# Patient Record
Sex: FEMALE | Race: BLACK OR AFRICAN AMERICAN | Employment: OTHER | URBAN - METROPOLITAN AREA
[De-identification: names, ages, dates, MRNs, and addresses within clinical notes are randomized per-mention and may not be internally consistent; named-entity substitution may affect disease eponyms.]

---

## 2017-02-20 RX ORDER — ROSUVASTATIN CALCIUM 10 MG/1
10 TABLET, COATED ORAL
OUTPATIENT
Start: 2017-02-20

## 2017-02-21 NOTE — TELEPHONE ENCOUNTER
Called and spoke with Patient, she states\" she will have to call me back after checking her calendar to schedule appt. \". She was informed she needs to have an appt w/in the next 2 weeks for medication refills.

## 2017-05-08 RX ORDER — LIOTHYRONINE SODIUM 5 UG/1
5 TABLET ORAL DAILY
Qty: 30 TAB | Refills: 0 | Status: SHIPPED | OUTPATIENT
Start: 2017-05-08 | End: 2017-06-08 | Stop reason: SDUPTHER

## 2017-05-08 RX ORDER — LEVOTHYROXINE SODIUM 100 UG/1
100 TABLET ORAL
Qty: 30 TAB | Refills: 0 | Status: SHIPPED | OUTPATIENT
Start: 2017-05-08 | End: 2017-06-08 | Stop reason: SDUPTHER

## 2017-05-12 ENCOUNTER — DOCUMENTATION ONLY (OUTPATIENT)
Dept: INTERNAL MEDICINE CLINIC | Age: 67
End: 2017-05-12

## 2017-05-12 ENCOUNTER — OFFICE VISIT (OUTPATIENT)
Dept: INTERNAL MEDICINE CLINIC | Age: 67
End: 2017-05-12

## 2017-05-12 ENCOUNTER — HOSPITAL ENCOUNTER (OUTPATIENT)
Dept: LAB | Age: 67
Discharge: HOME | End: 2017-05-12
Payer: MEDICARE

## 2017-05-12 VITALS
SYSTOLIC BLOOD PRESSURE: 113 MMHG | DIASTOLIC BLOOD PRESSURE: 71 MMHG | TEMPERATURE: 97.8 F | HEIGHT: 63 IN | HEART RATE: 74 BPM | BODY MASS INDEX: 33.66 KG/M2 | WEIGHT: 190 LBS | RESPIRATION RATE: 16 BRPM | OXYGEN SATURATION: 98 %

## 2017-05-12 DIAGNOSIS — Z00.00 ROUTINE GENERAL MEDICAL EXAMINATION AT A HEALTH CARE FACILITY: ICD-10-CM

## 2017-05-12 DIAGNOSIS — E03.9 ACQUIRED HYPOTHYROIDISM: ICD-10-CM

## 2017-05-12 DIAGNOSIS — Z13.39 SCREENING FOR ALCOHOLISM: ICD-10-CM

## 2017-05-12 DIAGNOSIS — Z13.31 DEPRESSION SCREEN: ICD-10-CM

## 2017-05-12 DIAGNOSIS — E78.2 MIXED HYPERLIPIDEMIA: ICD-10-CM

## 2017-05-12 DIAGNOSIS — Z11.59 NEED FOR HEPATITIS C SCREENING TEST: ICD-10-CM

## 2017-05-12 DIAGNOSIS — E11.9 CONTROLLED TYPE 2 DIABETES MELLITUS WITHOUT COMPLICATION, WITHOUT LONG-TERM CURRENT USE OF INSULIN (HCC): Primary | ICD-10-CM

## 2017-05-12 DIAGNOSIS — E55.9 HYPOVITAMINOSIS D: ICD-10-CM

## 2017-05-12 PROCEDURE — 83036 HEMOGLOBIN GLYCOSYLATED A1C: CPT

## 2017-05-12 PROCEDURE — 85027 COMPLETE CBC AUTOMATED: CPT

## 2017-05-12 PROCEDURE — 84439 ASSAY OF FREE THYROXINE: CPT

## 2017-05-12 PROCEDURE — 80053 COMPREHEN METABOLIC PANEL: CPT

## 2017-05-12 PROCEDURE — 82043 UR ALBUMIN QUANTITATIVE: CPT

## 2017-05-12 PROCEDURE — 84443 ASSAY THYROID STIM HORMONE: CPT

## 2017-05-12 PROCEDURE — 86803 HEPATITIS C AB TEST: CPT

## 2017-05-12 PROCEDURE — 36415 COLL VENOUS BLD VENIPUNCTURE: CPT

## 2017-05-12 PROCEDURE — 82306 VITAMIN D 25 HYDROXY: CPT

## 2017-05-12 PROCEDURE — 80061 LIPID PANEL: CPT

## 2017-05-12 RX ORDER — ROSUVASTATIN CALCIUM 5 MG/1
5 TABLET, COATED ORAL
Qty: 30 TAB | Refills: 1 | Status: SHIPPED | OUTPATIENT
Start: 2017-05-12 | End: 2018-10-10

## 2017-05-12 NOTE — MR AVS SNAPSHOT
Visit Information Date & Time Provider Department Dept. Phone Encounter #  
 5/12/2017 11:45 AM Nakia Zhao, [address] [id number] Follow-up Instructions Return in about 6 months (around 11/12/2017). Upcoming Health Maintenance Date Due Hepatitis C Screening 1950 EYE EXAM RETINAL OR DILATED Q1 2/21/1962 MICROALBUMIN Q1 2/12/2017 GLAUCOMA SCREENING Q2Y 2/21/2017 Pneumococcal 65+ Low/Medium Risk (1 of 2 - PCV13) 2/21/2017 MEDICARE YEARLY EXAM 2/21/2017 HEMOGLOBIN A1C Q6M 5/8/2017 FOOT EXAM Q1 7/5/2017 INFLUENZA AGE 9 TO ADULT 8/1/2017 LIPID PANEL Q1 11/22/2017 BREAST CANCER SCRN MAMMOGRAM 2/24/2018 COLONOSCOPY 10/4/2026 DTaP/Tdap/Td series (2 - Td) 11/14/2026 Allergies as of 5/12/2017  Review Complete On: 5/12/2017 By: Oswaldo Weir LPN Severity Noted Reaction Type Reactions Aspirin  11/24/2010    Hives Nsaids (Non-steroidal Anti-inflammatory Drug)  11/24/2010    Hives Percocet [Oxycodone-acetaminophen]  08/15/2011   Systemic Swelling Wrong medication---[facility] Current Immunizations  Never Reviewed No immunizations on file. Not reviewed this visit You Were Diagnosed With   
  
 Codes Comments Controlled type 2 diabetes mellitus without complication, without long-term current use of insulin ([facility]    -  Primary ICD-10-CM: E11.9 ICD-9-CM: 250.00 Mixed hyperlipidemia     ICD-10-CM: E78.2 ICD-9-CM: 272.2 Routine general medical examination at a health care facility     ICD-10-CM: Z00.00 ICD-9-CM: V70.0 Screening for alcoholism     ICD-10-CM: Z13.89 ICD-9-CM: V79.1 Hypovitaminosis D     ICD-10-CM: E55.9 ICD-9-CM: 268.9 Acquired hypothyroidism     ICD-10-CM: E03.9 ICD-9-CM: 244.9 Need for hepatitis C screening test     ICD-10-CM: Z11.59 
ICD-9-CM: V73.89 Vitals BP Pulse Temp Resp Height(growth percentile) Weight(growth percentile) 113/71 (BP 1 Location: Left arm, BP Patient Position: Sitting) 74 97.8 °F (36.6 °C) (Oral) 16 5' 3\" (1.6 m) 190 lb (86.2 kg) SpO2 BMI OB Status Smoking Status 98% 33.66 kg/m2 Postmenopausal Former Smoker Vitals History BMI and BSA Data Body Mass Index Body Surface Area  
 33.66 kg/m 2 1.96 m 2 Preferred Pharmacy Pharmacy Name Phone [facility] - [address] [place] [address] [address], [address] [facility] [phone] Your Updated Medication List  
  
   
This list is accurate as of: 5/12/17  1:19 PM.  Always use your most recent med list.  
  
  
  
  
 aspirin 81 mg chewable tablet Take 1 Tab by mouth daily. glucose blood VI test strips strip Commonly known as:  FREESTYLE LITE STRIPS Daily  
  
 levothyroxine 100 mcg tablet Commonly known as:  SYNTHROID Take 1 Tab by mouth Daily (before breakfast). Indications: must be brand name  
  
 liothyronine 5 mcg tablet Commonly known as:  CYTOMEL Take 1 Tab by mouth daily. metFORMIN  mg tablet Commonly known as:  GLUCOPHAGE XR Take 1 Tab by mouth daily (with dinner). rosuvastatin 5 mg tablet Commonly known as:  CRESTOR Take 1 Tab by mouth nightly. Start one night per week titrate up as tolerated SUPER B COMPLEX PO Take  by mouth every other day. VITAMIN D3 2,000 unit Tab Generic drug:  cholecalciferol (vitamin D3) Take 2,000 Int'l Units by mouth Every Mon, Wed & Sun.  
  
  
  
  
Prescriptions Sent to Pharmacy Refills  
 rosuvastatin (CRESTOR) 5 mg tablet 1 Sig: Take 1 Tab by mouth nightly. Start one night per week titrate up as tolerated Class: Normal  
 Pharmacy: [address] [place] [address], [address] [place] [address] Ph #: [phone] Route: Oral  
  
We Performed the Following AMB POC EKG ROUTINE W/ 12 LEADS, INTER & REP [46733 CPT(R)] CBC W/O DIFF [42719 CPT(R)] HEMOGLOBIN A1C WITH EAG [55155 CPT(R)] HEPATITIS C AB [49255 CPT(R)] LIPID PANEL [83170 CPT(R)] METABOLIC PANEL, COMPREHENSIVE [48552 CPT(R)] MICROALBUMIN, UR, RAND W/ MICROALBUMIN/CREA RATIO [id number] CPT(R)] T4, FREE [id number] CPT(R)] TSH 3RD GENERATION [12591 CPT(R)] VITAMIN D, 25 HYDROXY [id number] CPT(R)] Follow-up Instructions Return in about 6 months (around 11/12/2017). Patient Instructions Medicare Part B Preventive Services Guidelines/Limitations Date last completed and Frequency Due Date Bone Mass Measurement 
(age 72 & older, biennial) Requires diagnosis related to osteoporosis or estrogen deficiency. Biennial benefit unless patient has history of long-term glucocorticoid tx or baseline is needed because initial test was by other method Completed 3/24/2016 
  
  
Recommended every 2 years Due 3/2018 Cardiovascular Screening Blood Tests (every 5 years) Total cholesterol, HDL, Triglycerides Order as a panel if possible Completed 6/2016 
  
Recommended annually Due 6/2017 Colorectal Cancer Screening 
-Fecal occult blood test (annual) -Flexible sigmoidoscopy (5y) 
-Screening colonoscopy (10y) -Barium Enema   Completed 10/04/16 with Dr. Andrey Licona 
  
Recommended every 10 years  Due 2026 Counseling to Prevent Tobacco Use (up to 8 sessions per year) - Counseling greater than 3 and up to 10 minutes - Counseling greater than 10 minutes Patients must be asymptomatic of tobacco-related conditions to receive as preventive service  n/a  n/a Diabetes Screening Tests (at least every 3 years, Medicare covers annually or at 6-month intervals for prediabetic patients) 
  Fasting blood sugar (FBS) or glucose tolerance test (GTT) Patient must be diagnosed with one of the following: 
-Hypertension, Dyslipidemia, obesity, previous impaired FBS or GTT 
Or any two of the following: overweight, FH of diabetes, age ? 65, history of gestational diabetes, birth of baby weighing more than 9 pounds Completed: a1c 6.0 in 11/2016 
  
  
Recommended every 3-6 months for Pre-Diabetics and Diabetics Due now Diabetes Self-Management Training (DSMT) (no USPSTF recommendation) Requires referral by treating physician for patient with diabetes or renal disease. 10 hours of initial DSMT session of no less than 30 minutes each in a continuous 12-month period. 2 hours of follow-up DSMT in subsequent years. Deloria Soheila Glaucoma Screening (no USPSTF recommendation) Diabetes mellitus, family history, , age 48 or over,  American, age 72 or over Completed around 2014 
  
Recommended annually Retinal screen today complete 5/17 Due 5/18 Human Immunodeficiency Virus (HIV) Screening (annually for increased risk patients) HIV-1 and HIV-2 by EIA, ARIE, rapid antibody test, or oral mucosa transudate Patient must be at increased risk for HIV infection per USPSTF guidelines or pregnant. Tests covered annually for patients at increased risk. Pregnant patients may receive up to 3 test during pregnancy. N/a  N/a Medical Nutrition Therapy (MNT) (for diabetes or renal disease not recommended schedule) Requires referral by treating physician for patient with diabetes or renal disease. Can be provided in same year as diabetes self-management training (DSMT), and CMS recommends medical nutrition therapy take place after DSMT. Up to 3 hours for initial year and 2 hours in subsequent years. N/a  N/a Prostate Cancer Screening (annually up to age 76) - Digital rectal exam (KADE) - Prostate specific antigen (PSA) Annually (age 48 or over), KADE not paid separately when covered E/M service is provided on same date N/a  N/a Seasonal Influenza Vaccination (annually)   Recommended annually You decline this vaccination Pneumococcal Vaccination (once after 65)   Pneumococcal 23 - Recommended once over the age of 72 
  
 Prevnar 13 - Recommended once over the age of 72 You decline this vaccination 
  
  
You decline this vaccination Hepatitis B Vaccinations (if medium/high risk) Medium/high risk factors: End-stage renal disease, Hemophiliacs who received Factor VIII or IX concentrates, Clients of institutions for the mentally retarded, Persons who live in the same house as a HepB virus carrier, Homosexual men, Illicit injectable drug abusers. N/a  N/a Screening Mammography (biennial age 54-69)? Annually (age 36 or over) Completed 2/24/16  
  
Recommended annually Due now, please call and schedule this Screening Pap Tests and Pelvic Examination (up to age 79 and after 79 if unknown history or abnormal study last 10 years) Every 24 months except high risk Completed 7/16 with TORRI Owen 
  
Recommended every other year Due 7/2018 Ultrasound Screening for Abdominal Aortic Aneurysm (AAA) (once) Patient must be referred through IPPE and not have had a screening for abdominal aortic aneurysm before under Medicare. Limited to patients who meet one of the following criteria: 
- Men who are 73-68 years old and have smoked more than 100 cigarettes in their lifetime. 
-Anyone with a FH of AAA 
-Anyone recommended for screening by USPSTF  no family history  Not covered by Medicare as preventive. Family Practice Management 2011 
  
 
Please bring in a copy of your advanced directive to your next office visit so we can have a copy on file. Introducing [facility]! Dear Tonio: Thank you for requesting a [employer] account. Our records indicate that you already have an active [employer] account. You can access your account anytime at [email or web address] Did you know that you can access your hospital and ER discharge instructions at any time in [employer]? You can also review all of your test results from your hospital stay or ER visit. Additional Information If you have questions, please visit the Frequently Asked Questions section of the [employer] website at [email or web address] Remember, [employer] is NOT to be used for urgent needs. For medical emergencies, dial 911. Now available from your iPhone and Android! Please provide this summary of care documentation to your next provider. Your primary care clinician is listed as Eric Edouard. If you have any questions after today's visit, please call [phone].

## 2017-05-12 NOTE — PROGRESS NOTES
This is a \"Welcome to [employer]"  Initial Preventive Physical Examination (IPPE) providing Personalized Prevention Plan Services (Performed in the first 12 months of enrollment)    I have reviewed the patient's medical history in detail and updated the computerized patient record. History     Past Medical History:   Diagnosis Date    Fibrocystic breast     Headache     Hypercholesterolemia     Hypovitaminosis D     Shingles     Shingles 3/2011    Thyroid disease       Past Surgical History:   Procedure Laterality Date    BREAST SURGERY PROCEDURE UNLISTED      breast biopsy- both breast    HX  SECTION      FL COLONOSCOPY FLX DX W/COLLJ SPEC WHEN PFRMD      patient states done about 5 years ago     Current Outpatient Prescriptions   Medication Sig Dispense Refill    levothyroxine (SYNTHROID) 100 mcg tablet Take 1 Tab by mouth Daily (before breakfast). Indications: must be brand name 30 Tab 0    liothyronine (CYTOMEL) 5 mcg tablet Take 1 Tab by mouth daily. 30 Tab 0    metFORMIN ER (GLUCOPHAGE XR) 500 mg tablet Take 1 Tab by mouth daily (with dinner). 90 Tab 1    glucose blood VI test strips (FREESTYLE LITE STRIPS) strip Daily 10 Strip 0    aspirin 81 mg chewable tablet Take 1 Tab by mouth daily. 30 Tab 3    FERROUS FUMARATE/VIT BCOMP&C (SUPER B COMPLEX PO) Take  by mouth every other day.  cholecalciferol, vitamin D3, (VITAMIN D3) 2,000 unit Tab Take 2,000 Int'l Units by mouth Every Mon, Wed & Sun.      rosuvastatin (CRESTOR) 10 mg tablet Take 10 mg by mouth nightly.        Allergies   Allergen Reactions    Aspirin Hives    Nsaids (Non-Steroidal Anti-Inflammatory Drug) Hives    Percocet [Oxycodone-Acetaminophen] Swelling     Wrong medication---patient says it was [facility]     Family History   Problem Relation Age of Onset    Hypertension Mother     Arthritis-rheumatoid Brother      Social History   Substance Use Topics    Smoking status: Former Smoker     Packs/day: 0.25     Quit date: 2/15/2010    Smokeless tobacco: Never Used    Alcohol use Yes      Comment: occasional     Diet, Lifestyle: follows a diabetic diet regularly, sedentary, nonsmoker, caffeine intake one daily, alcohol intake rare,     Exercise level: not active or used to walk currently no exercise    Depression Risk Screen     PHQ over the last two weeks 5/12/2017   Little interest or pleasure in doing things Not at all   Feeling down, depressed or hopeless Not at all   Total Score PHQ 2 0   no depression discussed. Alcohol Risk Screen   On any occasion during the past 3 months, have you had more than 3 drinks containing alcohol? No    Do you average more than 7 drinks per week? No    Functional Ability and Level of Safety     Hearing Loss   normal-to-mild  No issues, had a hearing test 2 years ago with dr Anne Nicole group no issue    Activities of Daily Living   Self-care   Does not need help with any ADLS self sufficient   Fall Risk Screen     Fall Risk Assessment, last 12 mths 5/12/2017   Able to walk? Yes   Fall in past 12 months? No   no falls  Abuse Screen   Patient is not abused  Lives with , safe enviroment  Review of Systems   A comprehensive review of systems was negative except for that written in the HPI. Physical Examination     No exam data present     Visit Vitals    /71 (BP 1 Location: Left arm, BP Patient Position: Sitting)    Pulse 74    Temp 97.8 °F (36.6 °C) (Oral)    Resp 16    Ht 5' 3\" (1.6 m)    Wt 190 lb (86.2 kg)    SpO2 98%    BMI 33.66 kg/m2     General:  Alert, cooperative, no distress, appears stated age. Head:  Normocephalic, without obvious abnormality, atraumatic. Eyes:  Conjunctivae/corneas clear. PERRL, EOMs intact. Ears:  Normal TMs and external ear canals both ears. Nose: Nares normal. Septum midline. Mucosa normal. No drainage or sinus tenderness.    Throat: Lips, mucosa, and tongue normal. Teeth and gums normal.   Neck: Supple, symmetrical, trachea midline, no adenopathy, thyroid: no enlargement/tenderness/nodules, no carotid bruit and no JVD. Back:   Symmetric, no curvature. ROM normal. No CVA tenderness. Lungs:   Clear to auscultation bilaterally. Chest wall:  No tenderness or deformity. Heart:  Regular rate and rhythm, S1, S2 normal, no murmur, click, rub or gallop. Breast Exam:  No tenderness, masses, or nipple abnormality. Abdomen:   Soft, non-tender. No masses,  No organomegaly. Extremities: Extremities normal, atraumatic, no cyanosis or edema. Pulses: 2+ and symmetric all extremities. Skin: Skin color, texture, turgor normal. No rashes or lesions. EKG Screening: normal EKG, normal sinus rhythm, unchanged from previous tracings. Patient Care Team:  Rea Humphries MD as PCP - General (Internal Medicine)  Stuart Rodriguez MD (Endocrinology)  Ulysses Dress, MD (Orthopedic Surgery)  Michelle Lozano MD (Gastroenterology)  Isabel Owen NP (Obstetrics & Gynecology)   ENT hearing  Test dr Tiffany Lane group, not seeing currently    Updated list.   End-of-life planning  Advanced Directive discussed and documented: YES      Assessment/Plan   Education and counseling provided:  Are appropriate based on today's review and evaluation  End-of-Life planning (with patient's consent)  Pneumococcal Vaccine  Influenza Vaccine  Screening Mammography  Screening Pap and pelvic (covered once every 2 years)  Cardiovascular screening blood test  Screening for glaucoma  Diabetes screening test    ICD-10-CM ICD-9-CM    1. Controlled type 2 diabetes mellitus without complication, without long-term current use of insulin (HCC) E11.9 250.00    2. Mixed hyperlipidemia E78.2 272.2    3. Routine general medical examination at a health care facility Z00.00 V70.0    4. Screening for alcoholism Z13.89 V79.1    . Discussed with patient about advance medical directive. Provided patient blank AMD and Your Right to Decide Booklet. Requested that if completed to provide a copy of AMD to office. ACP planning begun today, SDM is.     Colonoscopy: 10/04/16, Dr. Alan Huynh, repeat 10 years  Pap: TORRI Owen,   Due   Mammogram: [facility],  negative, fmhx breast cancer-sister age 52, benign breast bx  DEXA: 16 normal  Due     AAA: denies fmhx --no screen needed    Eye exam: , due, will schedule  Retinal screen: 17     EK17 normal sinus   Hep C screen:  ordered   Lipids:  ,  ordered  A1c:   6.0 check today     Declines all immunizations    Snellen done    Medication reconciliation completed by MA and reviewed by me. Medical/surgical/social/family history reviewed and updated by me. Patient provided AVS and preventative screening table. Patient verbalized understanding of all information discussed.

## 2017-05-12 NOTE — PATIENT INSTRUCTIONS
Medicare Part B Preventive Services Guidelines/Limitations Date last completed and Frequency Due Date   Bone Mass Measurement  (age 72 & older, biennial) Requires diagnosis related to osteoporosis or estrogen deficiency. Biennial benefit unless patient has history of long-term glucocorticoid tx or baseline is needed because initial test was by other method Completed 3/24/2016        Recommended every 2 years Due 3/2018   Cardiovascular Screening Blood Tests (every 5 years)  Total cholesterol, HDL, Triglycerides Order as a panel if possible Completed 6/2016     Recommended annually Due 6/2017   Colorectal Cancer Screening  -Fecal occult blood test (annual)  -Flexible sigmoidoscopy (5y)  -Screening colonoscopy (10y)  -Barium Enema   Completed 10/04/16 with Dr. Sigrid Duckworth     Recommended every 10 years  Due 2026   Counseling to Prevent Tobacco Use (up to 8 sessions per year)  - Counseling greater than 3 and up to 10 minutes  - Counseling greater than 10 minutes Patients must be asymptomatic of tobacco-related conditions to receive as preventive service  n/a  n/a   Diabetes Screening Tests (at least every 3 years, Medicare covers annually or at 6-month intervals for prediabetic patients)     Fasting blood sugar (FBS) or glucose tolerance test (GTT) Patient must be diagnosed with one of the following:  -Hypertension, Dyslipidemia, obesity, previous impaired FBS or GTT  Or any two of the following: overweight, FH of diabetes, age ? 72, history of gestational diabetes, birth of baby weighing more than 9 pounds Completed: a1c 6.0 in 11/2016        Recommended every 3-6 months for Pre-Diabetics and Diabetics Due now   Diabetes Self-Management Training (DSMT) (no USPSTF recommendation) Requires referral by treating physician for patient with diabetes or renal disease. 10 hours of initial DSMT session of no less than 30 minutes each in a continuous 12-month period.  2 hours of follow-up DSMT in subsequent years. Servando Mazariegos Glaucoma Screening (no USPSTF recommendation) Diabetes mellitus, family history, , age 48 or over,  American, age 72 or over Completed around 2014     Recommended annually    Retinal screen today complete 5/17 Due 5/18   Human Immunodeficiency Virus (HIV) Screening (annually for increased risk patients)  HIV-1 and HIV-2 by EIA, ARIE, rapid antibody test, or oral mucosa transudate Patient must be at increased risk for HIV infection per USPSTF guidelines or pregnant. Tests covered annually for patients at increased risk. Pregnant patients may receive up to 3 test during pregnancy. N/a  N/a    Medical Nutrition Therapy (MNT) (for diabetes or renal disease not recommended schedule) Requires referral by treating physician for patient with diabetes or renal disease. Can be provided in same year as diabetes self-management training (DSMT), and CMS recommends medical nutrition therapy take place after DSMT. Up to 3 hours for initial year and 2 hours in subsequent years. N/a  N/a   Prostate Cancer Screening (annually up to age 76)  - Digital rectal exam (KADE)  - Prostate specific antigen (PSA) Annually (age 48 or over), KADE not paid separately when covered E/M service is provided on same date N/a  N/a    Seasonal Influenza Vaccination (annually)   Recommended annually You decline this vaccination   Pneumococcal Vaccination (once after 72)   Pneumococcal 23 -  Recommended once over the age of 72     Prevnar 15 - Recommended once over the age of 72 You decline this vaccination        You decline this vaccination   Hepatitis B Vaccinations (if medium/high risk) Medium/high risk factors: End-stage renal disease,  Hemophiliacs who received Factor VIII or IX concentrates, Clients of institutions for the mentally retarded, Persons who live in the same house as a HepB virus carrier, Homosexual men, Illicit injectable drug abusers. N/a  N/a    Screening Mammography (biennial age 54-69)?  Annually (age 36 or over) Completed 2/24/16      Recommended annually Due now, please call and schedule this    Screening Pap Tests and Pelvic Examination (up to age 79 and after 79 if unknown history or abnormal study last 10 years) Every 25 months except high risk Completed 7/16 with TORRI Owen     Recommended every other year Due 7/2018   Ultrasound Screening for Abdominal Aortic Aneurysm (AAA) (once) Patient must be referred through [facility] and not have had a screening for abdominal aortic aneurysm before under Medicare. Limited to patients who meet one of the following criteria:  - Men who are 73-68 years old and have smoked more than 100 cigarettes in their lifetime.  -Anyone with a FH of AAA  -Anyone recommended for screening by USPSTF  no family history  Not covered by Medicare as preventive. Family Practice Management 2011       Please bring in a copy of your advanced directive to your next office visit so we can have a copy on file.

## 2017-05-12 NOTE — LETTER
5/15/2017 2:56 PM 
 
[facility]. [facility]abody [address] [facility] [address] Dear Xavier Peabody: 
 
Please find your most recent results below. Resulted Orders HEPATITIS C AB Result Value Ref Range Hep C Virus Ab 0.1 0.0 - 0.9 s/co ratio Comment:  
                                     Negative:     < 0.8 Indeterminate: 0.8 - 0.9 Positive:     > 0.9 The CDC recommends that a positive HCV antibody result 
 be followed up with a HCV Nucleic Acid Amplification 
 test ([id number]). Narrative Performed at:  [address]  [id number] : Alexa Andres MD, Phone:  [phone] METABOLIC PANEL, COMPREHENSIVE Result Value Ref Range Glucose 84 65 - 99 mg/dL BUN 17 8 - 27 mg/dL Creatinine 0.77 0.57 - 1.00 mg/dL GFR est non-AA 81 >59 mL/min/1.73 GFR est AA 94 >59 mL/min/1.73  
 BUN/Creatinine ratio 22 12 - 28 Sodium 140 134 - 144 mmol/L Potassium 4.9 3.5 - 5.2 mmol/L Chloride 100 96 - 106 mmol/L  
 CO2 23 18 - 29 mmol/L Calcium 9.3 8.7 - 10.3 mg/dL Protein, total 7.0 6.0 - 8.5 g/dL Albumin 4.2 3.6 - 4.8 g/dL GLOBULIN, TOTAL 2.8 1.5 - 4.5 g/dL A-G Ratio 1.5 1.2 - 2.2 Bilirubin, total 0.3 0.0 - 1.2 mg/dL Alk. phosphatase 93 39 - 117 IU/L  
 AST (SGOT) 22 0 - 40 IU/L  
 ALT (SGPT) 17 0 - 32 IU/L Narrative Performed at:  [address]  [id number] : Alexa Andres MD, Phone:  [phone] MICROALBUMIN, UR, RAND W/ MICROALBUMIN/CREA RATIO Result Value Ref Range Creatinine, urine 137.0 Not Estab. mg/dL Microalbumin, urine 3.5 Not Estab. ug/mL Microalb/Creat ratio (ug/mg creat.) 2.6 0.0 - 30.0 mg/g creat Narrative Performed at:  [address]  [id number] : Alexa Andres MD, Phone:  [phone] [facility] W/O DIFF  
 Result Value Ref Range WBC 3.5 3.4 - 10.8 x10E3/uL  
 RBC 4.78 3.77 - 5.28 x10E6/uL HGB 13.7 11.1 - 15.9 g/dL HCT 40.0 34.0 - 46.6 % MCV 84 79 - 97 fL  
 MCH 28.7 26.6 - 33.0 pg  
 MCHC 34.3 31.5 - 35.7 g/dL  
 RDW 13.4 12.3 - 15.4 % PLATELET 735 (H) 532 - 379 x10E3/uL Narrative Performed at:  [address]  [id number] : Linder Lanes MD, Phone:  [phone] T4, FREE Result Value Ref Range T4, Free 1.65 0.82 - 1.77 ng/dL Narrative Performed at:  [address]  [id number] : Linder Lanes MD, Phone:  [phone] TSH 3RD GENERATION Result Value Ref Range TSH 0.839 0.450 - 4.500 uIU/mL Narrative Performed at:  [address]  [id number] : Linder Lanes MD, Phone:  [phone] VITAMIN D, 25 HYDROXY Result Value Ref Range VITAMIN D, 25-HYDROXY 33.7 30.0 - 100.0 ng/mL Comment:  
   Vitamin D deficiency has been defined by the [address] [place] [address] practice guideline as a 
level of serum 25-OH vitamin D less than 20 ng/mL (1,2). The Endocrine Society went on to further define vitamin D 
insufficiency as a level between 21 and 29 ng/mL (2). 1. IOM ([employer]). 2010. Dietary reference 
   intakes for calcium and D. [facility]: The 
   [employer]. 2. Kitty MF, Rafaela NC, Daphne MCCARTY, et al. 
   Evaluation, treatment, and prevention of vitamin D 
   deficiency: an Endocrine Society clinical practice 
   guideline. JCEM. 2011 Jul; 96(7):1911-30. Narrative Performed at:  [address]  [id number] : Linder Lanes MD, Phone:  [phone] LIPID PANEL Result Value Ref Range Cholesterol, total 244 (H) 100 - 199 mg/dL Triglyceride 49 0 - 149 mg/dL HDL Cholesterol 94 >39 mg/dL VLDL, calculated 10 5 - 40 mg/dL LDL, calculated 140 (H) 0 - 99 mg/dL Narrative Performed at:  [address]  [id number] : Josue Lehman MD, Phone:  [phone] HEMOGLOBIN A1C WITH EAG Result Value Ref Range Hemoglobin A1c 6.2 (H) 4.8 - 5.6 % Comment:  
            Pre-diabetes: 5.7 - 6.4 Diabetes: >6.4 Glycemic control for adults with diabetes: <7.0 Estimated average glucose 131 mg/dL Narrative Performed at:  [address]  [id number] : Josue Lehman MD, Phone:  [phone] RECOMMENDATIONS: 
 
 
Please call me if you have any questions: [phone] Sincerely, 
 
 
Omero [place] MD

## 2017-05-12 NOTE — PROGRESS NOTES
HISTORY OF PRESENT ILLNESS  Corey Burgess is a 74 y.o. female. HPI   Last here 11/14/16.  Pt is here to f/u on chronic conditions    BP today is good-113/71  She is not currently taking any medications for this    BS at home running around 80s in the AM fasting   Continues metformin XR 500mg once daily   She also takes ASA 81mg daily   Pt has been to diabetic education class in the past   She went with her  previously     Pt follows with Dr. Akila Perdue (endo) for her thyroid  Last saw her 11/22/16, has f/u pending next week in 5/17   Pt will be completing labs per this physician next week   Continues synthroid 100mcg and cytomel 5mg daily  She takes these medications together each morning   She may not follow up with her further   I can manage her thyroid    Reviewed last labs 11/16  Repeat labs today     Wt is down 3 lbs since last visit   Pt tries to watch her diet  She has not been exercising as much recently   Discussed diet and weight loss     Pt follows with Dr. Javi Crow (ortho)   Last saw him earlier in 3/17 for shoulder pain   She had an MRI completed   Her sx have resolved off statin     No longer taking her crestor for cholesterol   She had been this this every 3 days on average   She never increased this further than this   She stopped this about one month ago on her own   Pt has never tried taking this just once per week   Pt has not had any shoulder pain or myalgias since this   Discussed there is benefit to taking this 1-2 times per week   Advised trying to take this at lowest dose of once per week   She has some of this at home    Denies any hearing loss  Pt had previously hearing evaluation per ENT  She completed this several years ago  Not currently following     ACP: Began discussion today, SDM is her   Provided information today for her to complete at home    Discussed hcm   Pt is overdue d/t caring for her mother-in-law with stage 4 colon cancer, in hospice    PREVENTIVE: Colonoscopy: 10/04/16, Dr. Denise Pineda, repeat 10 years  Pap: NP Reneacortez Owen,    Mammogram: [facility],  negative, fmhx breast cancer-sister age 52, benign breast bx  DEXA: 16 normal   AAA: denies fmhx   Foot exam: 17  Microalbumin:   A1c: 3/13, 6.0, 3/15 6.1, 10/15 5.9,  6.0,  6.2,  6.0  Eye exam: , due, will schedule  Retinal screen: 17   Hep C screen:  ordered   EK17 normal sinus   Lipids:  ,  ordered  Declines all immunizations      Patient Active Problem List    Diagnosis Date Noted    Controlled type 2 diabetes mellitus without complication, without long-term current use of insulin ([facility] [address] 2016    Shingles 2013    Encounter for long-term (current) use of other medications 08/15/2011    Mixed hyperlipidemia 2010    Thyroid disease     Headache     Fibrocystic breast     Hypovitaminosis D      Current Outpatient Prescriptions   Medication Sig Dispense Refill    levothyroxine (SYNTHROID) 100 mcg tablet Take 1 Tab by mouth Daily (before breakfast). Indications: must be brand name 30 Tab 0    liothyronine (CYTOMEL) 5 mcg tablet Take 1 Tab by mouth daily. 30 Tab 0    metFORMIN ER (GLUCOPHAGE XR) 500 mg tablet Take 1 Tab by mouth daily (with dinner). 90 Tab 1    glucose blood VI test strips (FREESTYLE LITE STRIPS) strip Daily 10 Strip 0    aspirin 81 mg chewable tablet Take 1 Tab by mouth daily. 30 Tab 3    FERROUS FUMARATE/VIT BCOMP&C (SUPER B COMPLEX PO) Take  by mouth every other day.  cholecalciferol, vitamin D3, (VITAMIN D3) 2,000 unit Tab Take 2,000 Int'l Units by mouth Every Mon, Wed & Sun.      rosuvastatin (CRESTOR) 10 mg tablet Take 10 mg by mouth nightly.        Past Surgical History:   Procedure Laterality Date    BREAST SURGERY PROCEDURE UNLISTED      breast biopsy- both breast    HX  SECTION      CO COLONOSCOPY FLX DX W/COLLJ SPEC WHEN PFRMD      patient states done about 5 years ago      Lab Results  Component Value Date/Time   WBC 4.1 11/08/2016 09:04 AM   HGB 13.0 11/08/2016 09:04 AM   HCT 39.3 11/08/2016 09:04 AM   PLATELET 526 36/52/8815 09:04 AM   MCV 87 11/08/2016 09:04 AM       Lab Results  Component Value Date/Time   Cholesterol, total 233 06/29/2016 08:33 AM   HDL Cholesterol 97 06/29/2016 08:33 AM   LDL, calculated 125 06/29/2016 08:33 AM   Triglyceride 54 06/29/2016 08:33 AM   CHOL/HDL Ratio 3.1 01/20/2010 03:14 PM       Lab Results  Component Value Date/Time   GFR est AA 83 11/08/2016 09:04 AM   GFR est non-AA 72 11/08/2016 09:04 AM   Creatinine 0.86 11/08/2016 09:04 AM   BUN 15 11/08/2016 09:04 AM   Sodium 141 11/08/2016 09:04 AM   Potassium 4.8 11/08/2016 09:04 AM   Chloride 103 11/08/2016 09:04 AM   CO2 24 11/08/2016 09:04 AM         Review of Systems   Constitutional: Negative for chills and fever. HENT: Positive for tinnitus. Negative for hearing loss. Eyes: Negative for blurred vision and double vision. Respiratory: Negative for shortness of breath and wheezing. Cardiovascular: Negative for chest pain and palpitations. Gastrointestinal: Negative for nausea and vomiting. Genitourinary: Negative for dysuria and frequency. Musculoskeletal: Negative for back pain and falls. Skin: Negative for itching and rash. Neurological: Negative for dizziness, loss of consciousness and headaches. Psychiatric/Behavioral: Negative for depression. The patient is not nervous/anxious. Physical Exam   Constitutional: She is oriented to person, place, and time. She appears well-developed and well-nourished. No distress. HENT:   Head: Normocephalic and atraumatic. Right Ear: External ear normal.   Left Ear: External ear normal.   Mouth/Throat: Oropharynx is clear and moist. No oropharyngeal exudate. Eyes: Conjunctivae and EOM are normal. Right eye exhibits no discharge. Left eye exhibits no discharge. Neck: Normal range of motion. Neck supple.    No carotid bruits Cardiovascular: Normal rate, regular rhythm, normal heart sounds and intact distal pulses. Exam reveals no gallop and no friction rub. No murmur heard. Pulmonary/Chest: Effort normal and breath sounds normal. No respiratory distress. She has no wheezes. She has no rales. She exhibits no tenderness. Abdominal: Soft. She exhibits no distension. There is no tenderness. There is no rebound and no guarding. Musculoskeletal: Normal range of motion. She exhibits no edema, tenderness or deformity. Lymphadenopathy:     She has no cervical adenopathy. Neurological: She is alert and oriented to person, place, and time. Coordination normal.   Monofilament nl BLE, good peripheral pulses, no ulcers     Skin: Skin is warm and dry. No rash noted. She is not diaphoretic. No erythema. No pallor. Psychiatric: She has a normal mood and affect. Her behavior is normal.       ASSESSMENT and PLAN    ICD-10-CM ICD-9-CM    1. Controlled type 2 diabetes mellitus without complication, without long-term current use of insulin ([facility])    Currently follows with Dr. Akila Perdue though may be changing to me to follow this, continue metformin once daily, check a1c today, will fax copy of labs to Akila Perdue office. ACE Inhibitor or ARB therapy not prescibed for patient reasons--bp not elevated not interested in extra meds. E11.9 250.00 HEPATITIS C AB      METABOLIC PANEL, COMPREHENSIVE      MICROALBUMIN, UR, RAND W/ MICROALBUMIN/CREA RATIO      CBC W/O DIFF      T4, FREE      TSH 3RD GENERATION      VITAMIN D, 25 HYDROXY      LIPID PANEL      HEMOGLOBIN A1C WITH EAG   2. Mixed hyperlipidemia    Will resume crestor with just one time per week, three times per week caused myalgias. E78.2 272.2 HEPATITIS C AB      METABOLIC PANEL, COMPREHENSIVE      MICROALBUMIN, UR, RAND W/ MICROALBUMIN/CREA RATIO      CBC W/O DIFF      T4, FREE      TSH 3RD GENERATION      VITAMIN D, 25 HYDROXY      LIPID PANEL      HEMOGLOBIN A1C WITH EAG   3.  Routine general medical examination at a health care facility Z00.00 V70.0 HEPATITIS C AB      METABOLIC PANEL, COMPREHENSIVE      MICROALBUMIN, UR, RAND W/ MICROALBUMIN/CREA RATIO      CBC W/O DIFF      T4, FREE      TSH 3RD GENERATION      VITAMIN D, 25 HYDROXY      LIPID PANEL      HEMOGLOBIN A1C WITH EAG      AMB POC EKG ROUTINE W/ 12 LEADS, INTER & REP   4. Screening for alcoholism    Screen   Z13.89 V79.1 HEPATITIS C AB      METABOLIC PANEL, COMPREHENSIVE      MICROALBUMIN, UR, RAND W/ MICROALBUMIN/CREA RATIO      CBC W/O DIFF      T4, FREE      TSH 3RD GENERATION      VITAMIN D, 25 HYDROXY      LIPID PANEL      HEMOGLOBIN A1C WITH EAG   5. Hypovitaminosis D    Check level, continue OTC vit D E55.9 268.9 HEPATITIS C AB      METABOLIC PANEL, COMPREHENSIVE      MICROALBUMIN, UR, RAND W/ MICROALBUMIN/CREA RATIO      CBC W/O DIFF      T4, FREE      TSH 3RD GENERATION      VITAMIN D, 25 HYDROXY      LIPID PANEL      HEMOGLOBIN A1C WITH EAG   6. Acquired hypothyroidism    Controlled on cytomel 5mg and synthroid 100mcg daily. Currently kapros following, check tft adjust meds as needed E03.9 244.9 HEPATITIS C AB      METABOLIC PANEL, COMPREHENSIVE      MICROALBUMIN, UR, RAND W/ MICROALBUMIN/CREA RATIO      CBC W/O DIFF      T4, FREE      TSH 3RD GENERATION      VITAMIN D, 25 HYDROXY      LIPID PANEL      HEMOGLOBIN A1C WITH EAG   7. Need for hepatitis C screening test    Screen    Z11.59 V73.89 HEPATITIS C AB      METABOLIC PANEL, COMPREHENSIVE      MICROALBUMIN, UR, RAND W/ MICROALBUMIN/CREA RATIO      CBC W/O DIFF      T4, FREE      TSH 3RD GENERATION      VITAMIN D, 25 HYDROXY      LIPID PANEL      HEMOGLOBIN A1C WITH EAG        Depression screen reviewed and negative    Written by Calista Pratt, as dictated by Jon Tarango MD.    Current diagnosis and concerns discussed with pt at length.  Understands risks and benefits or current treatment plan and medications and accepts the treatment and medication with any possible risks.   Pt asks appropriate questions which were answered.   Pt instructed to call with any concerns or problems. This note will not be viewable in [address]

## 2017-05-12 NOTE — PROGRESS NOTES
Medicare Part B Preventive Services Guidelines/Limitations Date last completed and Frequency Due Date   Bone Mass Measurement  (age 72 & older, biennial) Requires diagnosis related to osteoporosis or estrogen deficiency. Biennial benefit unless patient has history of long-term glucocorticoid tx or baseline is needed because initial test was by other method Completed 3/24/2016      Recommended every 2 years Due 3/2018   Cardiovascular Screening Blood Tests (every 5 years)  Total cholesterol, HDL, Triglycerides Order as a panel if possible Completed 6/2016    Recommended annually Due 6/2017   Colorectal Cancer Screening  -Fecal occult blood test (annual)  -Flexible sigmoidoscopy (5y)  -Screening colonoscopy (10y)  -Barium Enema  Completed 10/04/16 with Dr. Andrey Licona    Recommended every 10 years  Due 2026   Counseling to Prevent Tobacco Use (up to 8 sessions per year)  - Counseling greater than 3 and up to 10 minutes  - Counseling greater than 10 minutes Patients must be asymptomatic of tobacco-related conditions to receive as preventive service     Diabetes Screening Tests (at least every 3 years, Medicare covers annually or at 6-month intervals for prediabetic patients)    Fasting blood sugar (FBS) or glucose tolerance test (GTT) Patient must be diagnosed with one of the following:  -Hypertension, Dyslipidemia, obesity, previous impaired FBS or GTT  Or any two of the following: overweight, FH of diabetes, age ? 72, history of gestational diabetes, birth of baby weighing more than 9 pounds Completed: a1c 6.0 in 11/2016      Recommended every 3-6 months for Pre-Diabetics and Diabetics Due now   Diabetes Self-Management Training (DSMT) (no USPSTF recommendation) Requires referral by treating physician for patient with diabetes or renal disease. 10 hours of initial DSMT session of no less than 30 minutes each in a continuous 12-month period. 2 hours of follow-up DSMT in subsequent years.      Glaucoma Screening (no USPSTF recommendation) Diabetes mellitus, family history, , age 48 or over,  American, age 72 or over Completed around 2014    Recommended annually Due now, please call and schedule this screening. Human Immunodeficiency Virus (HIV) Screening (annually for increased risk patients)  HIV-1 and HIV-2 by EIA, ARIE, rapid antibody test, or oral mucosa transudate Patient must be at increased risk for HIV infection per USPSTF guidelines or pregnant. Tests covered annually for patients at increased risk. Pregnant patients may receive up to 3 test during pregnancy. N/a  N/a    Medical Nutrition Therapy (MNT) (for diabetes or renal disease not recommended schedule) Requires referral by treating physician for patient with diabetes or renal disease. Can be provided in same year as diabetes self-management training (DSMT), and CMS recommends medical nutrition therapy take place after DSMT. Up to 3 hours for initial year and 2 hours in subsequent years. N/a  N/a   Prostate Cancer Screening (annually up to age 76)  - Digital rectal exam (KADE)  - Prostate specific antigen (PSA) Annually (age 48 or over), KADE not paid separately when covered E/M service is provided on same date N/a  N/a    Seasonal Influenza Vaccination (annually)  Recommended annually You decline this vaccination   Pneumococcal Vaccination (once after 72)  Pneumococcal 23 -  Recommended once over the age of 72    Prevnar 15 - Recommended once over the age of 72   You decline this vaccination      You decline this vaccination   Hepatitis B Vaccinations (if medium/high risk) Medium/high risk factors:  End-stage renal disease,  Hemophiliacs who received Factor VIII or IX concentrates, Clients of institutions for the mentally retarded, Persons who live in the same house as a HepB virus carrier, Homosexual men, Illicit injectable drug abusers. N/a  N/a    Screening Mammography (biennial age 54-69)?  Annually (age 36 or over) Completed 2/24/16 Recommended annually   Due now, please call and schedule this    Screening Pap Tests and Pelvic Examination (up to age 79 and after 79 if unknown history or abnormal study last 10 years) Every 25 months except high risk Completed 7/16 with NP Renea Owen    Recommended every other year Due 7/2018   Ultrasound Screening for Abdominal Aortic Aneurysm (AAA) (once) Patient must be referred through [facility] and not have had a screening for abdominal aortic aneurysm before under Medicare. Limited to patients who meet one of the following criteria:  - Men who are 73-68 years old and have smoked more than 100 cigarettes in their lifetime.  -Anyone with a FH of AAA  -Anyone recommended for screening by USPSTF  Not covered by Medicare as preventive.    Family Practice Management 2011

## 2017-05-13 LAB
25(OH)D3+25(OH)D2 SERPL-MCNC: 33.7 NG/ML (ref 30–100)
ALBUMIN SERPL-MCNC: 4.2 G/DL (ref 3.6–4.8)
ALBUMIN/CREAT UR: 2.6 MG/G CREAT (ref 0–30)
ALBUMIN/GLOB SERPL: 1.5 {RATIO} (ref 1.2–2.2)
ALP SERPL-CCNC: 93 IU/L (ref 39–117)
ALT SERPL-CCNC: 17 IU/L (ref 0–32)
AST SERPL-CCNC: 22 IU/L (ref 0–40)
BILIRUB SERPL-MCNC: 0.3 MG/DL (ref 0–1.2)
BUN SERPL-MCNC: 17 MG/DL (ref 8–27)
BUN/CREAT SERPL: 22 (ref 12–28)
CALCIUM SERPL-MCNC: 9.3 MG/DL (ref 8.7–10.3)
CHLORIDE SERPL-SCNC: 100 MMOL/L (ref 96–106)
CHOLEST SERPL-MCNC: 244 MG/DL (ref 100–199)
CO2 SERPL-SCNC: 23 MMOL/L (ref 18–29)
CREAT SERPL-MCNC: 0.77 MG/DL (ref 0.57–1)
CREAT UR-MCNC: 137 MG/DL
ERYTHROCYTE [DISTWIDTH] IN BLOOD BY AUTOMATED COUNT: 13.4 % (ref 12.3–15.4)
EST. AVERAGE GLUCOSE BLD GHB EST-MCNC: 131 MG/DL
GLOBULIN SER CALC-MCNC: 2.8 G/DL (ref 1.5–4.5)
GLUCOSE SERPL-MCNC: 84 MG/DL (ref 65–99)
HBA1C MFR BLD: 6.2 % (ref 4.8–5.6)
HCT VFR BLD AUTO: 40 % (ref 34–46.6)
HCV AB S/CO SERPL IA: 0.1 S/CO RATIO (ref 0–0.9)
HDLC SERPL-MCNC: 94 MG/DL
HGB BLD-MCNC: 13.7 G/DL (ref 11.1–15.9)
LDLC SERPL CALC-MCNC: 140 MG/DL (ref 0–99)
MCH RBC QN AUTO: 28.7 PG (ref 26.6–33)
MCHC RBC AUTO-ENTMCNC: 34.3 G/DL (ref 31.5–35.7)
MCV RBC AUTO: 84 FL (ref 79–97)
MICROALBUMIN UR-MCNC: 3.5 UG/ML
PLATELET # BLD AUTO: 444 X10E3/UL (ref 150–379)
POTASSIUM SERPL-SCNC: 4.9 MMOL/L (ref 3.5–5.2)
PROT SERPL-MCNC: 7 G/DL (ref 6–8.5)
RBC # BLD AUTO: 4.78 X10E6/UL (ref 3.77–5.28)
SODIUM SERPL-SCNC: 140 MMOL/L (ref 134–144)
T4 FREE SERPL-MCNC: 1.65 NG/DL (ref 0.82–1.77)
TRIGL SERPL-MCNC: 49 MG/DL (ref 0–149)
TSH SERPL DL<=0.005 MIU/L-ACNC: 0.84 UIU/ML (ref 0.45–4.5)
VLDLC SERPL CALC-MCNC: 10 MG/DL (ref 5–40)
WBC # BLD AUTO: 3.5 X10E3/UL (ref 3.4–10.8)

## 2017-05-13 NOTE — PROGRESS NOTES
--Fax copy of results to blue office--endocrine     --let the patient know dm is controlled, cholesterol up , resume crestor once per week as discussed in clinic    Schedule routine f/u 6 months if still seeing blue, 3 months if endocrinology not involved

## 2017-05-15 NOTE — PROGRESS NOTES
Called, spoke to pt. Two pt identifiers confirmed. Pt informed per Dr. Paris Frances dm is controlled, cholesterol up , resume crestor once per week as discussed in clinic. Pt offered and accepted appt for 8/15/17 [id number]. Pt would like to follow w/ Dr. Paris Frances. Pt verbalized understanding of information discussed w/ no further questions at this time.

## 2017-05-17 NOTE — PROGRESS NOTES
Called, spoke to pt. Two pt identifiers confirmed. Pt informed per Dr. Leona Lopez  no diabetic retinopathy, repeat 1 year. Pt verbalized understanding of information discussed w/ no further questions at this time.

## 2017-06-08 RX ORDER — LEVOTHYROXINE SODIUM 100 UG/1
TABLET ORAL
Qty: 30 TAB | Refills: 0 | Status: SHIPPED | OUTPATIENT
Start: 2017-06-08 | End: 2017-07-17 | Stop reason: SDUPTHER

## 2017-06-08 RX ORDER — LIOTHYRONINE SODIUM 5 UG/1
TABLET ORAL
Qty: 30 TAB | Refills: 0 | Status: SHIPPED | OUTPATIENT
Start: 2017-06-08 | End: 2017-07-17 | Stop reason: SDUPTHER

## 2017-07-17 RX ORDER — LIOTHYRONINE SODIUM 5 UG/1
TABLET ORAL
Qty: 30 TAB | Refills: 6 | Status: SHIPPED | OUTPATIENT
Start: 2017-07-17 | End: 2017-12-12 | Stop reason: SDUPTHER

## 2017-07-17 RX ORDER — LEVOTHYROXINE SODIUM 100 UG/1
TABLET ORAL
Qty: 30 TAB | Refills: 6 | Status: SHIPPED | OUTPATIENT
Start: 2017-07-17 | End: 2017-12-19 | Stop reason: SDUPTHER

## 2017-12-19 RX ORDER — LEVOTHYROXINE SODIUM 100 UG/1
TABLET ORAL
Qty: 30 TAB | Refills: 0 | Status: SHIPPED | OUTPATIENT
Start: 2017-12-19 | End: 2018-01-29 | Stop reason: SDUPTHER

## 2018-01-10 ENCOUNTER — HOSPITAL ENCOUNTER (OUTPATIENT)
Dept: LAB | Age: 68
Discharge: HOME | End: 2018-01-10
Payer: MEDICARE

## 2018-01-10 ENCOUNTER — OFFICE VISIT (OUTPATIENT)
Dept: INTERNAL MEDICINE CLINIC | Age: 68
End: 2018-01-10

## 2018-01-10 VITALS
OXYGEN SATURATION: 98 % | TEMPERATURE: 97.9 F | SYSTOLIC BLOOD PRESSURE: 109 MMHG | BODY MASS INDEX: 33.66 KG/M2 | WEIGHT: 190 LBS | RESPIRATION RATE: 16 BRPM | DIASTOLIC BLOOD PRESSURE: 69 MMHG | HEIGHT: 63 IN | HEART RATE: 69 BPM

## 2018-01-10 DIAGNOSIS — E07.9 THYROID DISEASE: ICD-10-CM

## 2018-01-10 DIAGNOSIS — E78.2 MIXED HYPERLIPIDEMIA: ICD-10-CM

## 2018-01-10 DIAGNOSIS — E55.9 HYPOVITAMINOSIS D: ICD-10-CM

## 2018-01-10 DIAGNOSIS — E11.9 CONTROLLED TYPE 2 DIABETES MELLITUS WITHOUT COMPLICATION, WITHOUT LONG-TERM CURRENT USE OF INSULIN (HCC): Primary | ICD-10-CM

## 2018-01-10 PROCEDURE — 84443 ASSAY THYROID STIM HORMONE: CPT

## 2018-01-10 PROCEDURE — 85027 COMPLETE CBC AUTOMATED: CPT

## 2018-01-10 PROCEDURE — 80053 COMPREHEN METABOLIC PANEL: CPT

## 2018-01-10 PROCEDURE — 36415 COLL VENOUS BLD VENIPUNCTURE: CPT

## 2018-01-10 PROCEDURE — 83036 HEMOGLOBIN GLYCOSYLATED A1C: CPT

## 2018-01-10 PROCEDURE — 82043 UR ALBUMIN QUANTITATIVE: CPT

## 2018-01-10 RX ORDER — METFORMIN HYDROCHLORIDE 500 MG/1
500 TABLET, EXTENDED RELEASE ORAL
Qty: 90 TAB | Refills: 0 | Status: SHIPPED | OUTPATIENT
Start: 2018-01-10 | End: 2018-04-05 | Stop reason: SDUPTHER

## 2018-01-10 NOTE — PROGRESS NOTES
HISTORY OF PRESENT ILLNESS  Hanna Rendon is a 74 y.o. female. HPI   Last here 16. Pt is here to f/u on chronic conditions     BP today is 109/69  She is not currently taking any medications   Not monitoring at home     BS at home running around 80 in the AM fasting   Pt denies BS in the 90 or 100 range  Continues metformin XR 500mg once daily   She also takes ASA 81mg daily   Pt has been to a diabetic education class with her  in the past  Discussed her a1c being in pre-diabetic/diabetic ranges    Wt is stable since last visit   Pt was not exercising over the Summer d/t death of mother-in-law  Pt is reducing her portion sizes  Pt plans on exercising in the near future  Discussed decreasing caloric beverage and increasing water intake      Reviewed labs   Will get labs today      Pt prev followed with Dr. Joel Mejia (endo) for her thyroid  Last visit was    Continues synthroid 100mcg and cytomel 5mg daily  She takes these medications together each morning      Pt saw with Dr. Quinn Dacosta (ortho) for shoulder pain  Last visit was 3/17  Her sx have resolved off crestor     Pt is still not taking crestor for her cholesterol  Advised her to take this 1-2x weekly  Recall pt had myalgias on crestor    Of note, her mother-in-law passed away in Summer 2017  She is going through the nl grieving process     ACP not on file.  SDM is her .      PREVENTIVE:   Colonoscopy: 10/04/16, Dr. Stella Bravo, repeat 10 years  Pap: TORRI Owen, , due   Mammogram: [facility], , negative, will get report for review, fmhx breast cancer-sister age 52, benign breast bx  DEXA: 16, normal, due   AAA: denies fmhx   Foot exam: 17  Microalbumin:   A1c: 3/13, 6.0, 3/15 6.1, 10/15 5.9,  6.0,  6.2,  6.0,  6.2  Eye exam: , due, will schedule  Retinal screen: 17, no diabetic retinopathy   Lipids:    Hep C screen: , negative   EK17, nsr    Declines all immunizations       Patient Active Problem List    Diagnosis Date Noted    Controlled type 2 diabetes mellitus without complication, without long-term current use of insulin ([facility] [address] 2016    Shingles 2013    Encounter for long-term (current) use of other medications 08/15/2011    Mixed hyperlipidemia 2010    Thyroid disease     Headache(784.0)     Fibrocystic breast     Hypovitaminosis D      Current Outpatient Prescriptions   Medication Sig Dispense Refill    SYNTHROID 100 mcg tablet TAKE ONE TABLET BY MOUTH DAILY BEFORE BREAKFAST 30 Tab 0    liothyronine (CYTOMEL) 5 mcg tablet TAKE ONE TABLET BY MOUTH DAILY 30 Tab 0    metFORMIN ER (GLUCOPHAGE XR) 500 mg tablet Take 1 Tab by mouth daily (with dinner). 90 Tab 1    glucose blood VI test strips (FREESTYLE LITE STRIPS) strip Daily 10 Strip 0    aspirin 81 mg chewable tablet Take 1 Tab by mouth daily. 30 Tab 3    FERROUS FUMARATE/VIT BCOMP&C (SUPER B COMPLEX PO) Take  by mouth every other day.  cholecalciferol, vitamin D3, (VITAMIN D3) 2,000 unit Tab Take 2,000 Int'l Units by mouth Every Mon, Wed & Sun.      rosuvastatin (CRESTOR) 5 mg tablet Take 1 Tab by mouth nightly.  Start one night per week titrate up as tolerated 30 Tab 1     Past Surgical History:   Procedure Laterality Date    BREAST SURGERY PROCEDURE UNLISTED      breast biopsy- both breast    HX  SECTION      MI COLONOSCOPY FLX DX W/COLLJ SPEC WHEN PFRMD      patient states done about 5 years ago      Lab Results  Component Value Date/Time   WBC 3.5 2017 12:19 PM   HGB 13.7 2017 12:19 PM   HCT 40.0 2017 12:19 PM   PLATELET 994  12:19 PM   MCV 84 2017 12:19 PM     Lab Results  Component Value Date/Time   Cholesterol, total 244 2017 12:19 PM   HDL Cholesterol 94 2017 12:19 PM   LDL, calculated 140 2017 12:19 PM   LDL-C, External 157 10/07/2015   Triglyceride 49 2017 12:19 PM   CHOL/HDL Ratio 3.1 01/20/2010 03:14 PM     Lab Results  Component Value Date/Time   GFR est non-AA 81 05/12/2017 12:19 PM   GFR est AA 94 05/12/2017 12:19 PM   Creatinine 0.77 05/12/2017 12:19 PM   BUN 17 05/12/2017 12:19 PM   Sodium 140 05/12/2017 12:19 PM   Potassium 4.9 05/12/2017 12:19 PM   Chloride 100 05/12/2017 12:19 PM   CO2 23 05/12/2017 12:19 PM        Review of Systems   Respiratory: Negative for shortness of breath. Cardiovascular: Negative for chest pain. Physical Exam   Constitutional: She is oriented to person, place, and time. She appears well-developed and well-nourished. No distress. HENT:   Head: Normocephalic and atraumatic. Mouth/Throat: Oropharynx is clear and moist. No oropharyngeal exudate. Eyes: Conjunctivae and EOM are normal. Right eye exhibits no discharge. Left eye exhibits no discharge. Neck: Normal range of motion. Neck supple. Cardiovascular: Normal rate, regular rhythm and normal heart sounds. Exam reveals no gallop and no friction rub. No murmur heard. Pulmonary/Chest: Effort normal and breath sounds normal. No respiratory distress. She has no wheezes. She has no rales. She exhibits no tenderness. Musculoskeletal: Normal range of motion. She exhibits no edema, tenderness or deformity. Lymphadenopathy:     She has no cervical adenopathy. Neurological: She is alert and oriented to person, place, and time. Coordination normal.   Skin: Skin is warm and dry. No rash noted. She is not diaphoretic. No erythema. No pallor. Psychiatric: She has a normal mood and affect. Her behavior is normal.       ASSESSMENT and PLAN    ICD-10-CM ICD-9-CM    1.  Controlled type 2 diabetes mellitus without complication, without long-term current use of insulin (HCC)    Controled on metformin XR 500mg daily, check a1c today and adjust dose prn   Y22.5 683.86 METABOLIC PANEL, COMPREHENSIVE      CBC W/O DIFF      TSH 3RD GENERATION      HEMOGLOBIN A1C WITH EAG      MICROALBUMIN, UR, RAND W/ MICROALBUMIN/CREA RATIO   2. Mixed hyperlipidemia    Not taking her Crestor, advised taking it daily, Lipid lowering therapy not prescibed for patient reasons myalgias. L82.5 004.2 METABOLIC PANEL, COMPREHENSIVE      CBC W/O DIFF      TSH 3RD GENERATION      HEMOGLOBIN A1C WITH EAG      MICROALBUMIN, UR, RAND W/ MICROALBUMIN/CREA RATIO   3. Thyroid disease    Stable on synthroid and cytomel, check TFTs and adjust dose prn   F15.5 528.7 METABOLIC PANEL, COMPREHENSIVE      CBC W/O DIFF      TSH 3RD GENERATION      HEMOGLOBIN A1C WITH EAG      MICROALBUMIN, UR, RAND W/ MICROALBUMIN/CREA RATIO   4. Hypovitaminosis D    Check level, continue OTC vit D   V49.5 643.6 METABOLIC PANEL, COMPREHENSIVE      CBC W/O DIFF      TSH 3RD GENERATION      HEMOGLOBIN A1C WITH EAG      MICROALBUMIN, UR, RAND W/ MICROALBUMIN/CREA RATIO        Depression screen reviewed and negative. Scribed by Felicia Treadwell of [address] [place] [address] [address], as dictated by Dr. Rainer Sultana. Current diagnosis and concerns discussed with pt at length. Pt understands risks and benefits or current treatment plan and medications, and accepts the treatment and medication with any possible risks. Pt asks appropriate questions, which were answered. Pt was instructed to call with any concerns or problems. This note will not be viewable in [address]

## 2018-01-11 LAB
ALBUMIN SERPL-MCNC: 4.4 G/DL (ref 3.6–4.8)
ALBUMIN/CREAT UR: 2.6 MG/G CREAT (ref 0–30)
ALBUMIN/GLOB SERPL: 1.6 {RATIO} (ref 1.2–2.2)
ALP SERPL-CCNC: 95 IU/L (ref 39–117)
ALT SERPL-CCNC: 14 IU/L (ref 0–32)
AST SERPL-CCNC: 18 IU/L (ref 0–40)
BILIRUB SERPL-MCNC: 0.3 MG/DL (ref 0–1.2)
BUN SERPL-MCNC: 13 MG/DL (ref 8–27)
BUN/CREAT SERPL: 16 (ref 12–28)
CALCIUM SERPL-MCNC: 9.9 MG/DL (ref 8.7–10.3)
CHLORIDE SERPL-SCNC: 99 MMOL/L (ref 96–106)
CO2 SERPL-SCNC: 25 MMOL/L (ref 18–29)
CREAT SERPL-MCNC: 0.81 MG/DL (ref 0.57–1)
CREAT UR-MCNC: 158.8 MG/DL
ERYTHROCYTE [DISTWIDTH] IN BLOOD BY AUTOMATED COUNT: 13.5 % (ref 12.3–15.4)
EST. AVERAGE GLUCOSE BLD GHB EST-MCNC: 126 MG/DL
GLOBULIN SER CALC-MCNC: 2.8 G/DL (ref 1.5–4.5)
GLUCOSE SERPL-MCNC: 85 MG/DL (ref 65–99)
HBA1C MFR BLD: 6 % (ref 4.8–5.6)
HCT VFR BLD AUTO: 40 % (ref 34–46.6)
HGB BLD-MCNC: 13.3 G/DL (ref 11.1–15.9)
MCH RBC QN AUTO: 28.9 PG (ref 26.6–33)
MCHC RBC AUTO-ENTMCNC: 33.3 G/DL (ref 31.5–35.7)
MCV RBC AUTO: 87 FL (ref 79–97)
MICROALBUMIN UR-MCNC: 4.1 UG/ML
PLATELET # BLD AUTO: 454 X10E3/UL (ref 150–379)
POTASSIUM SERPL-SCNC: 5.1 MMOL/L (ref 3.5–5.2)
PROT SERPL-MCNC: 7.2 G/DL (ref 6–8.5)
RBC # BLD AUTO: 4.6 X10E6/UL (ref 3.77–5.28)
SODIUM SERPL-SCNC: 139 MMOL/L (ref 134–144)
TSH SERPL DL<=0.005 MIU/L-ACNC: 0.73 UIU/ML (ref 0.45–4.5)
WBC # BLD AUTO: 3.3 X10E3/UL (ref 3.4–10.8)

## 2018-01-29 RX ORDER — LIOTHYRONINE SODIUM 5 UG/1
TABLET ORAL
Qty: 30 TAB | Refills: 1 | Status: SHIPPED | OUTPATIENT
Start: 2018-01-29 | End: 2018-02-23 | Stop reason: SDUPTHER

## 2018-01-29 RX ORDER — LEVOTHYROXINE SODIUM 100 UG/1
TABLET ORAL
Qty: 30 TAB | Refills: 1 | Status: SHIPPED | OUTPATIENT
Start: 2018-01-29 | End: 2018-02-26 | Stop reason: SDUPTHER

## 2018-02-23 RX ORDER — LIOTHYRONINE SODIUM 5 UG/1
TABLET ORAL
Qty: 30 TAB | Refills: 3 | Status: SHIPPED | OUTPATIENT
Start: 2018-02-23 | End: 2018-06-10 | Stop reason: SDUPTHER

## 2018-02-26 RX ORDER — LEVOTHYROXINE SODIUM 100 UG/1
TABLET ORAL
Qty: 30 TAB | Refills: 3 | Status: SHIPPED | OUTPATIENT
Start: 2018-02-26 | End: 2018-06-10 | Stop reason: SDUPTHER

## 2018-04-05 RX ORDER — METFORMIN HYDROCHLORIDE 500 MG/1
TABLET, EXTENDED RELEASE ORAL
Qty: 90 TAB | Refills: 1 | Status: SHIPPED | OUTPATIENT
Start: 2018-04-05 | End: 2019-11-25 | Stop reason: SDUPTHER

## 2018-05-08 ENCOUNTER — DOCUMENTATION ONLY (OUTPATIENT)
Dept: INTERNAL MEDICINE CLINIC | Age: 68
End: 2018-05-08

## 2018-05-08 NOTE — PROGRESS NOTES
Medicare Part B Preventive Services Guidelines/Limitations Date last completed and Frequency Due Date   Bone Mass Measurement  (age 72 & older, biennial) Requires diagnosis related to osteoporosis or estrogen deficiency. Biennial benefit unless patient has history of long-term glucocorticoid tx or baseline is needed because initial test was by other method Completed 5/2016      Recommended every 2 years Due 5/2018   Cardiovascular Screening Blood Tests (every 5 years)  Total cholesterol, HDL, Triglycerides Order as a panel if possible Completed 5/2017      Recommended annually Due 5/2018   Colorectal Cancer Screening  -Fecal occult blood test (annual)  -Flexible sigmoidoscopy (5y)  -Screening colonoscopy (10y)  -Barium Enema    Completed 10/04/16 with Dr. Leon Vargas  Recommended every 10 years  Due 10/2026   Counseling to Prevent Tobacco Use (up to 8 sessions per year)  - Counseling greater than 3 and up to 10 minutes  - Counseling greater than 10 minutes Patients must be asymptomatic of tobacco-related conditions to receive as preventive service N/A N/A   Diabetes Screening Tests (at least every 3 years, Medicare covers annually or at 6-month intervals for prediabetic patients)      Fasting blood sugar (FBS) or glucose tolerance test (GTT) Patient must be diagnosed with one of the following:  -Hypertension, Dyslipidemia, obesity, previous impaired FBS or GTT  Or any two of the following: overweight, FH of diabetes, age ? 72, history of gestational diabetes, birth of baby weighing more than 9 pounds Completed 1/2018 with A1C 6.0        Recommended every 3-6 months for Pre-Diabetics and Diabetics Due now-7/2018   Diabetes Self-Management Training (DSMT) (no USPSTF recommendation) Requires referral by treating physician for patient with diabetes or renal disease. 10 hours of initial DSMT session of no less than 30 minutes each in a continuous 12-month period. 2 hours of follow-up DSMT in subsequent years.  Completed in the past You are eligible for this class every 2 years. Please let us know if you are interested. Glaucoma Screening (no USPSTF recommendation) Diabetes mellitus, family history, , age 48 or over,  American, age 72 or over Eye exam - around 2014    Retinal scan - 5/2017      Recommended annually       Due 5/2018   Human Immunodeficiency Virus (HIV) Screening (annually for increased risk patients)  HIV-1 and HIV-2 by EIA, ARIE, rapid antibody test, or oral mucosa transudate Patient must be at increased risk for HIV infection per USPSTF guidelines or pregnant. Tests covered annually for patients at increased risk. Pregnant patients may receive up to 3 test during pregnancy. N/A N/A   Medical Nutrition Therapy (MNT) (for diabetes or renal disease not recommended schedule) Requires referral by treating physician for patient with diabetes or renal disease. Can be provided in same year as diabetes self-management training (DSMT), and CMS recommends medical nutrition therapy take place after DSMT. Up to 3 hours for initial year and 2 hours in subsequent years. N/A N/A   Prostate Cancer Screening (annually up to age 76)  - Digital rectal exam (KADE)  - Prostate specific antigen (PSA) Annually (age 48 or over), KADE not paid separately when covered E/M service is provided on same date N/A N/A   Seasonal Influenza Vaccination (annually)    Never completed     Recommended annually Declines   Pneumococcal Vaccination (once after 72)    Pneumococcal 23 - never completed       Prevnar 15 -  never completed     Both recommended once over the age of 72 Declines      Declines    Hepatitis B Vaccinations (if medium/high risk) Medium/high risk factors: End-stage renal disease,  Hemophiliacs who received Factor VIII or IX concentrates, Clients of institutions for the mentally retarded, Persons who live in the same house as a HepB virus carrier, Homosexual men, Illicit injectable drug abusers.  N/A N/A Screening Mammography (biennial age 54-69)? Annually (age 36 or over) Completed 7/2017      Recommended annually Due 7/2018    Screening Pap Tests and Pelvic Examination (up to age 79 and after 79 if unknown history or abnormal study last 10 years) Every 25 months except high risk Completed 7/2017 with NP Kya Owen      Recommended every other year Due 7/2019   Ultrasound Screening for Abdominal Aortic Aneurysm (AAA) (once) Patient must be referred through [facility] and not have had a screening for abdominal aortic aneurysm before under Medicare.  Limited to patients who meet one of the following criteria:  - Men who are 73-68 years old and have smoked more than 100 cigarettes in their lifetime.  -Anyone with a FH of AAA  -Anyone recommended for screening by USPSTF N/A N/A

## 2018-05-09 ENCOUNTER — OFFICE VISIT (OUTPATIENT)
Dept: INTERNAL MEDICINE CLINIC | Age: 68
End: 2018-05-09

## 2018-05-09 ENCOUNTER — HOSPITAL ENCOUNTER (OUTPATIENT)
Dept: LAB | Age: 68
Discharge: HOME | End: 2018-05-09
Payer: MEDICARE

## 2018-05-09 VITALS
HEIGHT: 63 IN | TEMPERATURE: 97.9 F | WEIGHT: 191 LBS | SYSTOLIC BLOOD PRESSURE: 113 MMHG | OXYGEN SATURATION: 97 % | HEART RATE: 71 BPM | BODY MASS INDEX: 33.84 KG/M2 | RESPIRATION RATE: 16 BRPM | DIASTOLIC BLOOD PRESSURE: 73 MMHG

## 2018-05-09 DIAGNOSIS — Z00.00 MEDICARE ANNUAL WELLNESS VISIT, SUBSEQUENT: ICD-10-CM

## 2018-05-09 DIAGNOSIS — E55.9 HYPOVITAMINOSIS D: ICD-10-CM

## 2018-05-09 DIAGNOSIS — E78.2 MIXED HYPERLIPIDEMIA: ICD-10-CM

## 2018-05-09 DIAGNOSIS — E07.9 THYROID DISEASE: ICD-10-CM

## 2018-05-09 DIAGNOSIS — M89.9 BONE DISEASE: ICD-10-CM

## 2018-05-09 DIAGNOSIS — E66.9 OBESITY (BMI 30.0-34.9): ICD-10-CM

## 2018-05-09 DIAGNOSIS — E11.9 CONTROLLED TYPE 2 DIABETES MELLITUS WITHOUT COMPLICATION, WITHOUT LONG-TERM CURRENT USE OF INSULIN (HCC): Primary | ICD-10-CM

## 2018-05-09 PROCEDURE — 83036 HEMOGLOBIN GLYCOSYLATED A1C: CPT

## 2018-05-09 PROCEDURE — 80061 LIPID PANEL: CPT

## 2018-05-09 PROCEDURE — 80048 BASIC METABOLIC PNL TOTAL CA: CPT

## 2018-05-09 PROCEDURE — 36415 COLL VENOUS BLD VENIPUNCTURE: CPT

## 2018-05-09 NOTE — PROGRESS NOTES
This is the Subsequent Medicare Annual Wellness Exam, performed 12 months or more after the Initial AWV or the last Subsequent AWV    I have reviewed the patient's medical history in detail and updated the computerized patient record. History     Past Medical History:   Diagnosis Date    Fibrocystic breast     Headache(784.0)     Hypercholesterolemia     Hypovitaminosis D     Shingles     Shingles 3/2011    Thyroid disease       Past Surgical History:   Procedure Laterality Date    BREAST SURGERY PROCEDURE UNLISTED      breast biopsy- both breast    HX  SECTION      [place] COLONOSCOPY FLX DX W/COLLJ SPEC WHEN PFRMD      patient states done about 5 years ago     Current Outpatient Prescriptions   Medication Sig Dispense Refill    metFORMIN ER (GLUCOPHAGE XR) 500 mg tablet Take 1 Tab by mouth daily (with dinner). 90 Tab 1    SYNTHROID 100 mcg tablet TAKE ONE TABLET BY MOUTH DAILY BEFORE BREAKFAST 30 Tab 3    liothyronine (CYTOMEL) 5 mcg tablet TAKE ONE TABLET BY MOUTH DAILY 30 Tab 3    glucose blood VI test strips (FREESTYLE LITE STRIPS) strip Daily 10 Strip 0    aspirin 81 mg chewable tablet Take 1 Tab by mouth daily. 30 Tab 3    FERROUS FUMARATE/VIT BCOMP&C (SUPER B COMPLEX PO) Take  by mouth every other day.  cholecalciferol, vitamin D3, (VITAMIN D3) 2,000 unit Tab Take 2,000 Int'l Units by mouth Every Mon, Wed & Sun.      rosuvastatin (CRESTOR) 5 mg tablet Take 1 Tab by mouth nightly.  Start one night per week titrate up as tolerated 30 Tab 1     Allergies   Allergen Reactions    Aspirin Hives    Nsaids (Non-Steroidal Anti-Inflammatory Drug) Hives    Percocet [Oxycodone-Acetaminophen] Swelling     Wrong medication---patient says it was [facility]     Family History   Problem Relation Age of Onset    Hypertension Mother     Arthritis-rheumatoid Brother      Social History   Substance Use Topics    Smoking status: Former Smoker     Packs/day: 0.25     Quit date: 2/15/2010   Holly Titus Smokeless tobacco: Never Used    Alcohol use Yes      Comment: occasional     Patient Active Problem List   Diagnosis Code    Mixed hyperlipidemia E78.2    Thyroid disease E07.9    Headache(784.0) R51    Fibrocystic breast N60.19    Hypovitaminosis D E55.9    Encounter for long-term (current) use of other medications Z79.899    Shingles B02.9    Controlled type 2 diabetes mellitus without complication, without long-term current use of insulin (HCC) E11.9       Depression Risk Factor Screening:     PHQ over the last two weeks 5/9/2018   Little interest or pleasure in doing things Not at all   Feeling down, depressed or hopeless Not at all   Total Score PHQ 2 0     Alcohol Risk Factor Screening: You do not drink alcohol or very rarely. Functional Ability and Level of Safety:   Hearing Loss  Hearing is good. Activities of Daily Living  The home contains: no safety equipment. Patient does total self care    Fall Risk  Fall Risk Assessment, last 12 mths 5/9/2018   Able to walk? Yes   Fall in past 12 months? No       Abuse Screen  Patient is not abused  Lives with , happy   Cognitive Screening   Evaluation of Cognitive Function:  Has your family/caregiver stated any concerns about your memory: no  Normal    Patient Care Team   Patient Care Team:  Adry Muro MD as PCP - General (Internal Medicine)  Ilia Avalos MD (Endocrinology)  Adalberto Coreas MD (Orthopedic Surgery)  Adriana Knight MD (Gastroenterology)  Dilia Owen NP (Obstetrics & Gynecology)  Kelley Park MD (Ophthalmology)   updated    Assessment/Plan   Education and counseling provided:  Are appropriate based on today's review and evaluation  End-of-Life planning (with patient's consent)  Screening Mammography  Screening Pap and pelvic (covered once every 2 years)  Bone mass measurement (DEXA)  Screening for glaucoma  Diabetes screening test    Diagnoses and all orders for this visit:    1.  Medicare annual wellness visit, subsequent      Health Maintenance Due   Topic Date Due    GLAUCOMA SCREENING Q2Y  2017    LIPID PANEL Q1  2018    EYE EXAM RETINAL OR DILATED Q1  2018    FOOT EXAM Q1  05/15/2018     Discussed with patient about advance medical directive. Provided patient blank AMD and Your Right to Decide Booklet. Requested that if completed to provide a copy of AMD to office. ACP not on file. SDM is her .   Provided information today.         Colonoscopy: Dr. Emmanuel Villarreal, 10/04/16, repeat 10 years  Pap: TORRI Owen, , due   Mammogram: [facility], , negative, will get report for review, fmx breast cancer, due  cancer-sister age 52, benign breast bx  DEXA: 16, normal, due , ordered  AAA: denies fmhx       Eye exam: [facility] Zafar Akbar at [facility], 3/28/18  Retinal screen: 17, no diabetic retinopathy     A1c:   6.0 due now  Lipids:   due now  Hep C screen: , negative     EK17, nsr      Declines all immunizations    Medication reconciliation completed by MA and reviewed by me. Medical/surgical/social/family history reviewed and updated by me. Patient provided AVS and preventative screening table. Patient verbalized understanding of all information discussed.

## 2018-05-09 NOTE — PROGRESS NOTES
HISTORY OF PRESENT ILLNESS  Crispin Pena is a 79 y.o. female. HPI   Last here 1/10/18. Pt is here to f/u on chronic conditions. Pt presents with her .  Eli  BP today is 113/73  She is not monitoring her BP at home  She is not currently taking BP medications       BS at home running around low 80s in the AM fasting   Continues metformin XR 500mg once daily   She also takes ASA 81mg daily   She has been to a diabetic education class with her  in the past     Wt is stable x lov  She and her  will work on [employer] and w/l  Discussed diet and w/l      Reviewed labs   Will get labs today       Pt prev followed with Dr. Nii Guardado (endo) for her thyroid  Last visit was    Continues synthroid 100mcg and cytomel 5mg daily  She takes these medications together each AM  She will only f/u with this physician prn     Pt saw with Dr. Frantz Spencer (ortho) for shoulder pain  Last visit was 3/17  Her sx resolved off crestor   She has not had to f/u with this ortho x lov     Pt is still not taking crestor for her cholesterol  Advised her to take this med 1-2x weekly  Recall pt had myalgias on crestor    Pt is independent (pt can drive/feed/bathe etc. herself)    Pt lives and gets along well with her      ACP not on file.  SDM is her .   Provided information today.       PREVENTIVE:   Colonoscopy: Dr. Ke Grullon, 10/04/16, repeat 10 years  Pap: TORRI Owen, , due   Mammogram: [facility], , negative, will get report for review, fmhx breast cancer, due  cancer-sister age 52, benign breast bx  DEXA: 16, normal, due , ordered  AAA: denies fmhx   Foot exam: 18   Microalbumin: , protein in the urine  A1c: 3/13, 6.0, 3/15 6.1, 10/15 5.9,  6.0,  6.2,  6.0,  6.2,  6.0  Eye exam: [facility]. Merlene Zayas at [facility], 3/28/18  Retinal screen: 17, no diabetic retinopathy   Lipids:    Hep C screen: , negative   EK17, nsr    Declines all immunizations    Patient Active Problem List    Diagnosis Date Noted    Controlled type 2 diabetes mellitus without complication, without long-term current use of insulin ([facility] 2016    Shingles 2013    Encounter for long-term (current) use of other medications 08/15/2011    Mixed hyperlipidemia 2010    Thyroid disease     Headache(784.0)     Fibrocystic breast     Hypovitaminosis D      Current Outpatient Prescriptions   Medication Sig Dispense Refill    metFORMIN ER (GLUCOPHAGE XR) 500 mg tablet Take 1 Tab by mouth daily (with dinner). 90 Tab 1    SYNTHROID 100 mcg tablet TAKE ONE TABLET BY MOUTH DAILY BEFORE BREAKFAST 30 Tab 3    liothyronine (CYTOMEL) 5 mcg tablet TAKE ONE TABLET BY MOUTH DAILY 30 Tab 3    glucose blood VI test strips (FREESTYLE LITE STRIPS) strip Daily 10 Strip 0    aspirin 81 mg chewable tablet Take 1 Tab by mouth daily. 30 Tab 3    FERROUS FUMARATE/VIT BCOMP&C (SUPER B COMPLEX PO) Take  by mouth every other day.  cholecalciferol, vitamin D3, (VITAMIN D3) 2,000 unit Tab Take 2,000 Int'l Units by mouth Every Mon, Wed & Sun.      rosuvastatin (CRESTOR) 5 mg tablet Take 1 Tab by mouth nightly.  Start one night per week titrate up as tolerated 30 Tab 1     Past Surgical History:   Procedure Laterality Date    BREAST SURGERY PROCEDURE UNLISTED      breast biopsy- both breast    HX  SECTION      [place] COLONOSCOPY FLX DX W/COLLJ SPEC WHEN PFRMD      patient states done about 5 years ago      Lab Results  Component Value Date/Time   WBC 3.3 (L) 01/10/2018 12:43 PM   HGB 13.3 01/10/2018 12:43 PM   HCT 40.0 01/10/2018 12:43 PM   PLATELET 346 (H)  12:43 PM   MCV 87 01/10/2018 12:43 PM     Lab Results  Component Value Date/Time   Cholesterol, total 244 (H) 2017 12:19 PM   HDL Cholesterol 94 2017 12:19 PM   LDL, calculated 140 (H) 2017 12:19 PM   LDL-C, External 157 10/07/2015   Triglyceride 49 2017 12:19 PM   CHOL/HDL Ratio 3.1 01/20/2010 03:14 PM     Lab Results  Component Value Date/Time   GFR est non-AA 76 01/10/2018 12:43 PM   GFR est AA 88 01/10/2018 12:43 PM   Creatinine 0.81 01/10/2018 12:43 PM   BUN 13 01/10/2018 12:43 PM   Sodium 139 01/10/2018 12:43 PM   Potassium 5.1 01/10/2018 12:43 PM   Chloride 99 01/10/2018 12:43 PM   CO2 25 01/10/2018 12:43 PM        Review of Systems   HENT: Negative for hearing loss. Respiratory: Negative for shortness of breath. Cardiovascular: Negative for chest pain. Musculoskeletal: Negative for falls. Psychiatric/Behavioral: Negative for depression and memory loss. Physical Exam   Constitutional: She is oriented to person, place, and time. She appears well-developed and well-nourished. No distress. HENT:   Head: Normocephalic and atraumatic. Right Ear: External ear normal.   Left Ear: External ear normal.   Mouth/Throat: Oropharynx is clear and moist. No oropharyngeal exudate. Eyes: Conjunctivae and EOM are normal. Pupils are equal, round, and reactive to light. Right eye exhibits no discharge. Left eye exhibits no discharge. No scleral icterus. Neck: Normal range of motion. Neck supple. No carotid bruits    Cardiovascular: Normal rate, regular rhythm, normal heart sounds and intact distal pulses. Exam reveals no gallop and no friction rub. No murmur heard. Pulmonary/Chest: Effort normal and breath sounds normal. No respiratory distress. She has no wheezes. She has no rales. She exhibits no tenderness. Abdominal: Soft. She exhibits no distension and no mass. There is no tenderness. There is no rebound and no guarding. Musculoskeletal: Normal range of motion. She exhibits no edema, tenderness or deformity. Lymphadenopathy:     She has no cervical adenopathy. Neurological: She is alert and oriented to person, place, and time. Coordination normal.   Monofilament nl BLE, good peripheral pulses, no ulcers    Skin: Skin is warm and dry. No rash noted.  She is not diaphoretic. No erythema. No pallor. Psychiatric: She has a normal mood and affect. Her behavior is normal.       ASSESSMENT and PLAN    ICD-10-CM ICD-9-CM    1. Controlled type 2 diabetes mellitus without complication, without long-term current use of insulin ([address]    Controled with metformin once daily, will check a1c today   H86.4 310.99 METABOLIC PANEL, BASIC      HEMOGLOBIN A1C WITH EAG      LIPID PANEL       DIABETES FOOT EXAM      DEXA BONE DENSITY STUDY AXIAL   2. Medicare annual wellness visit, subsequent B80.17 Y67.2 METABOLIC PANEL, BASIC      HEMOGLOBIN A1C WITH EAG      LIPID PANEL       DIABETES FOOT EXAM      DEXA BONE DENSITY STUDY AXIAL   3. Mixed hyperlipidemia    Lipid lowering therapy not prescibed for medical reasons intolerant of crestor, discussed trying to take it once weekly. J59.2 196.2 METABOLIC PANEL, BASIC      HEMOGLOBIN A1C WITH EAG      LIPID PANEL       DIABETES FOOT EXAM      DEXA BONE DENSITY STUDY AXIAL   4. Thyroid disease    Continues on synthroid and cytomel, works well, last TSH at goal   T13.1 313.2 METABOLIC PANEL, BASIC      HEMOGLOBIN A1C WITH EAG      LIPID PANEL       DIABETES FOOT EXAM      DEXA BONE DENSITY STUDY AXIAL   5. Hypovitaminosis D    Continues OTC vit D   N99.3 790.4 METABOLIC PANEL, BASIC      HEMOGLOBIN A1C WITH EAG      LIPID PANEL       DIABETES FOOT EXAM      DEXA BONE DENSITY STUDY AXIAL   6. Obesity (BMI 30.0-34. 9)    Discussed diet and w/l, portion control    S60.6 316.81 METABOLIC PANEL, BASIC      HEMOGLOBIN A1C WITH EAG      LIPID PANEL       DIABETES FOOT EXAM      DEXA BONE DENSITY STUDY AXIAL   7. Bone disease    DEXA ordered   H08.1 106.08 METABOLIC PANEL, BASIC      HEMOGLOBIN A1C WITH EAG      LIPID PANEL       DIABETES FOOT EXAM      DEXA BONE DENSITY STUDY AXIAL        Depression screen reviewed and negative. Scribed by Yuri Greco of [facility], as dictated by Dr. Berhane Smith.     Current diagnosis and concerns discussed with pt at length. Pt understands risks and benefits or current treatment plan and medications, and accepts the treatment and medication with any possible risks. Pt asks appropriate questions, which were answered. Pt was instructed to call with any concerns or problems. This note will not be viewable in [address]

## 2018-05-09 NOTE — PATIENT INSTRUCTIONS

## 2018-05-09 NOTE — MR AVS SNAPSHOT
[address] 
[phone] Patient: Zulay Thornton MRN: [id number] WK Visit Information Date & Time Provider Department Dept. Phone Encounter #  
 2018 11:15 AM Mary Jo Santacruz, [facility] [place] [id number] Follow-up Instructions Return in about 5 months (around 10/9/2018). Upcoming Health Maintenance Date Due  
 GLAUCOMA SCREENING Q2Y 2017 LIPID PANEL Q1 2018 EYE EXAM RETINAL OR DILATED Q1 2018 FOOT EXAM Q1 5/15/2018 MEDICARE YEARLY EXAM 2018 HEMOGLOBIN A1C Q6M 7/10/2018 Influenza Age 5 to Adult 2018 Pneumococcal 65+ Low/Medium Risk (2 of 2 - PPSV23) 1/10/2019 MICROALBUMIN Q1 1/10/2019 BREAST CANCER SCRN MAMMOGRAM 2019 COLONOSCOPY 10/4/2026 DTaP/Tdap/Td series (2 - Td) 2026 Allergies as of 2018  Review Complete On: 2018 By: Mary Jo Santacruz MD  
  
 Severity Noted Reaction Type Reactions Aspirin  2010    Hives Nsaids (Non-steroidal Anti-inflammatory Drug)  2010    Hives Percocet [Oxycodone-acetaminophen]  08/15/2011   Systemic Swelling Wrong medication---patient [facility] Stat[facility]-h[facility]a Reductase Inhibitors  2018    Myalgia Current Immunizations  Never Reviewed No immunizations on file. Not reviewed this visit You Were Diagnosed With   
  
 Codes Comments Controlled type 2 diabetes mellitus without complication, without long-term current use of insulin ([facility]    -  Primary ICD-10-CM: E11.9 ICD-9-CM: 250.00 Medicare annual wellness visit, subsequent     ICD-10-CM: Z00.00 ICD-9-CM: V70.0 Mixed hyperlipidemia     ICD-10-CM: E78.2 ICD-9-CM: 272.2 Thyroid disease     ICD-10-CM: E07.9 ICD-9-CM: 246.9 Hypovitaminosis D     ICD-10-CM: E55.9 ICD-9-CM: 268.9 Obesity (BMI 30.0-34.9)     ICD-10-CM: G36.5 ICD-9-CM: 278.00 Bone disease     ICD-10-CM: M89.9 ICD-9-CM: 733.90 Vitals BP Pulse Temp Resp Height(growth percentile) Weight(growth percentile) 113/73 (BP 1 Location: Left arm, BP Patient Position: Sitting) 71 97.9 °F (36.6 °C) (Oral) 16 5' 3\" (1.6 m) 191 lb (86.6 kg) SpO2 BMI OB Status Smoking Status 97% 33.83 kg/m2 Postmenopausal Former Smoker Vitals History BMI and BSA Data Body Mass Index Body Surface Area  
 33.83 kg/m 2 1.96 m 2 Preferred Pharmacy Pharmacy Name [facility], [place] [phone] Your Updated Medication List  
  
   
This list is accurate as of 5/9/18 12:05 PM.  Always use your most recent med list.  
  
  
  
  
 aspirin 81 mg chewable tablet Take 1 Tab by mouth daily. glucose blood VI test strips strip Commonly known as:  FREESTYLE LITE STRIPS Daily  
  
 liothyronine 5 mcg tablet Commonly known as:  CYTOMEL  
TAKE ONE TABLET BY MOUTH DAILY  
  
 metFORMIN  mg tablet Commonly known as:  GLUCOPHAGE XR Take 1 Tab by mouth daily (with dinner). rosuvastatin 5 mg tablet Commonly known as:  CRESTOR Take 1 Tab by mouth nightly. Start one night per week titrate up as tolerated SUPER B COMPLEX PO Take  by mouth every other day. SYNTHROID 100 mcg tablet Generic drug:  levothyroxine TAKE ONE TABLET BY MOUTH DAILY BEFORE BREAKFAST  
  
 VITAMIN D3 2,000 unit Tab Generic drug:  cholecalciferol (vitamin D3) Take 2,000 Int'l Units by mouth Every Mon, Wed & Sun. We Performed the Following HEMOGLOBIN A1C WITH EAG [15333 CPT(R)]  DIABETES FOOT EXAM [HM7 Custom] LIPID PANEL [19219 CPT(R)] METABOLIC PANEL, BASIC [32617 CPT(R)] Follow-up Instructions Return in about 5 months (around 10/9/2018). To-Do List   
 05/09/2018 Imaging:  DEXA BONE DENSITY STUDY AXIAL Patient Instructions Medicare Wellness Visit, Female The best way to live healthy is to have a healthy lifestyle by eating a well-balanced diet, exercising regularly, limiting alcohol and stopping smoking. Regular physical exams and screening tests are another way to keep healthy. Preventive exams provided by your health care provider can find health problems before they become diseases or illnesses. Preventive services including immunizations, screening tests, monitoring and exams can help you take care of your own health. All people over age 72 should have a pneumovax  and and a prevnar shot to prevent pneumonia. These are once in a lifetime unless you and your provider decide differently. All people over 65 should have a yearly flu shot and a tetanus vaccine every 10 years. A bone mass density to screen for osteoporosis or thinning of the bones should be done every 2 years after 65. Screening for diabetes mellitus with a blood sugar test should be done every year. Glaucoma is a disease of the eye due to increased ocular pressure that can lead to blindness and it should be done every year by an eye professional. 
 
Cardiovascular screening tests that check for elevated lipids (fatty part of blood) which can lead to heart disease and strokes should be done every 5 years. Colorectal screening that evaluates for blood or polyps in your colon should be done yearly as a stool test or every five years as a flexible sigmoidoscope or every 10 years as a colonoscopy up to age 76. Breast cancer screening with a mammogram is recommended biennially  for women age 54-69. Screening for cervical cancer with a pap smear and pelvic exam is recommended for women after age 72 years every 2 years up to age 79 or when the provider and patient decide to stop. If there is a history of cervical abnormalities or other increased risk for cancer then the test is recommended yearly. Hepatitis C screening is also recommended for anyone born between [birth year] through [address]. A shingles vaccine is also recommended once in a lifetime after age 61. Your Medicare Wellness Exam is recommended annually. Here is a list of your current Health Maintenance items with a due date: 
Health Maintenance Due Topic Date Due  Glaucoma Screening   02/21/2017  Cholesterol Test   05/12/2018 [facility] Eye Exam  05/13/2018 [facility] Diabetic Foot Care  05/15/2018 Introducing [facility] SERVICES! Dear Sean Jordan: Thank you for requesting a [employer] account. Our records indicate that you already have an active [employer] account. You can access your account anytime at [email or web address] Did you know that you can access your hospital and ER discharge instructions at any time in [employer]? You can also review all of your test results from your hospital stay or ER visit. Additional Information If you have questions, please visit the Frequently Asked Questions section of the [employer] website at [email or web address] Remember, [employer] is NOT to be used for urgent needs. For medical emergencies, dial 911. Now available from your iPhone and Android! Please provide this summary of care documentation to your next provider. Your primary care clinician is listed as Rick Naylor. If you have any questions after today's visit, please call [phone].

## 2018-05-10 LAB
BUN SERPL-MCNC: 16 MG/DL (ref 8–27)
BUN/CREAT SERPL: 18 (ref 12–28)
CALCIUM SERPL-MCNC: 9.7 MG/DL (ref 8.7–10.3)
CHLORIDE SERPL-SCNC: 100 MMOL/L (ref 96–106)
CHOLEST SERPL-MCNC: 261 MG/DL (ref 100–199)
CO2 SERPL-SCNC: 26 MMOL/L (ref 18–29)
CREAT SERPL-MCNC: 0.9 MG/DL (ref 0.57–1)
EST. AVERAGE GLUCOSE BLD GHB EST-MCNC: 123 MG/DL
GFR SERPLBLD CREATININE-BSD FMLA CKD-EPI: 67 ML/MIN/1.73
GFR SERPLBLD CREATININE-BSD FMLA CKD-EPI: 77 ML/MIN/1.73
GLUCOSE SERPL-MCNC: 92 MG/DL (ref 65–99)
HBA1C MFR BLD: 5.9 % (ref 4.8–5.6)
HDLC SERPL-MCNC: 89 MG/DL
LDLC SERPL CALC-MCNC: 160 MG/DL (ref 0–99)
POTASSIUM SERPL-SCNC: 5.3 MMOL/L (ref 3.5–5.2)
SODIUM SERPL-SCNC: 140 MMOL/L (ref 134–144)
TRIGL SERPL-MCNC: 60 MG/DL (ref 0–149)
VLDLC SERPL CALC-MCNC: 12 MG/DL (ref 5–40)

## 2018-06-10 RX ORDER — LEVOTHYROXINE SODIUM 100 UG/1
TABLET ORAL
Qty: 30 TAB | Refills: 1 | Status: SHIPPED | OUTPATIENT
Start: 2018-06-10 | End: 2019-02-06 | Stop reason: SDUPTHER

## 2018-06-10 RX ORDER — LIOTHYRONINE SODIUM 5 UG/1
TABLET ORAL
Qty: 30 TAB | Refills: 1 | Status: SHIPPED | OUTPATIENT
Start: 2018-06-10 | End: 2019-02-06 | Stop reason: SDUPTHER

## 2018-07-05 RX ORDER — LIOTHYRONINE SODIUM 5 UG/1
TABLET ORAL
Qty: 30 TAB | Refills: 6 | Status: SHIPPED | OUTPATIENT
Start: 2018-07-05 | End: 2018-10-10

## 2018-07-05 RX ORDER — LEVOTHYROXINE SODIUM 100 UG/1
TABLET ORAL
Qty: 30 TAB | Refills: 6 | Status: SHIPPED | OUTPATIENT
Start: 2018-07-05 | End: 2018-10-10

## 2018-10-10 ENCOUNTER — OFFICE VISIT (OUTPATIENT)
Dept: INTERNAL MEDICINE CLINIC | Age: 68
End: 2018-10-10

## 2018-10-10 VITALS
DIASTOLIC BLOOD PRESSURE: 68 MMHG | HEIGHT: 63 IN | HEART RATE: 68 BPM | BODY MASS INDEX: 34.02 KG/M2 | RESPIRATION RATE: 16 BRPM | TEMPERATURE: 97.9 F | WEIGHT: 192 LBS | OXYGEN SATURATION: 97 % | SYSTOLIC BLOOD PRESSURE: 109 MMHG

## 2018-10-10 DIAGNOSIS — E07.9 THYROID DISEASE: ICD-10-CM

## 2018-10-10 DIAGNOSIS — E78.2 MIXED HYPERLIPIDEMIA: ICD-10-CM

## 2018-10-10 DIAGNOSIS — R79.89 ELEVATED PLATELET COUNT: ICD-10-CM

## 2018-10-10 DIAGNOSIS — E11.9 CONTROLLED TYPE 2 DIABETES MELLITUS WITHOUT COMPLICATION, WITHOUT LONG-TERM CURRENT USE OF INSULIN (HCC): Primary | ICD-10-CM

## 2018-10-10 DIAGNOSIS — E55.9 HYPOVITAMINOSIS D: ICD-10-CM

## 2018-10-10 DIAGNOSIS — E66.9 OBESITY (BMI 30.0-34.9): ICD-10-CM

## 2018-10-10 NOTE — PROGRESS NOTES
HISTORY OF PRESENT ILLNESS 
Zuleyma Mares is a 79 y.o. female. HPI Last here 5/9/18. Pt is here to f/u on chronic conditions. Pt presents with her . 
Navya Smith 
BP today is 109/68 She is not monitoring her BP at home She is not currently taking BP medications ACE Inhibitor or ARB therapy not prescibed for patient reasons--not interested with nl bp . 
   
BS at home running around 75, 80 in the AM fasting Pt was started on ozempic once weekly-- she has been on it for 3 weeks, currently 0.25mg 
joy well, no SE, will taper up Continues metformin XR 500mg once daily She also takes ASA 81mg daily She has been to a diabetic education class with her  in the past 
   
Wt is stable x lov She and her  will work on [employer] and w/l Ozempic to help with w/l, especially once doses increased Discussed diet and w/l  
   
Reviewed labs 9/18 per Maria Elena: blood counts nl, kidney function nl, a1c 5.8, thyroid at goal, vit D low, cholesterol high Pt wonders about her platelet counts climbing, discussed this with her, and that they are still under 500 
   
Pt prev followed with Dr. Sergio Avina (endo) for her thyroid Last visit was 5/17 Continues synthroid 100mcg and cytomel 5mg daily She takes these medications together each AM 
Next visit is scheduled in about a month 
  
Pt saw with Dr. Juan J Mcguire (ortho) for shoulder pain Last visit was 3/17 Her sx resolved off crestor She has not had to f/u with this ortho 
   
Pt is still not taking crestor for her cholesterol Recall pt had myalgias on crestor, taking this 1-2x weekly did not help Pt has stopped taking vit D, but has restarted this 
  
ACP not on file. SDM is her .  
Provided information in the past.  
   
PREVENTIVE:  
Colonoscopy: Dr. Demetra Andrade, 10/04/16, repeat 10 years Pap: TORRI Owen, 7/17 [facility]ogram: [facility], 7/23/18, nl per pt, will get report for review, fmhx breast cancer--sister age 52, benign breast bx 
 DEXA: 18, normal 
AAA: denies fmhx Foot exam: 18 Microalbumin: , protein in the urine A1c: 3/13, 6.0, 3/15 6.1, 10/15 5.9,  6.0,  6.2,  6.0,  6.2,  6.0,  5.9,  5.8 Eye exam: [facility] Rad[facility]co [facility], 3/28/18 Retinal screen: 17, no diabetic retinopathy Lipids:   Hep C screen: , negative EK17, nsr   
Declines all immunizations Patient Active Problem List  
 Diagnosis Date Noted  Obesity (BMI 30.0-34.9) 2018  Controlled type 2 diabetes mellitus without complication, without long-term current use of insulin ([facility] 2016  Shingles 2013  Encounter for long-term (current) use of other medications 08/15/2011  Mixed hyperlipidemia 2010  Thyroid disease  Headache(784.0)  Fibrocystic breast   
 Hypovitaminosis D Current Outpatient Prescriptions Medication Sig Dispense Refill  liothyronine (CYTOMEL) 5 mcg tablet TAKE ONE TABLET BY MOUTH DAILY 30 Tab 6  
 SYNTHROID 100 mcg tablet TAKE ONE TABLET BY MOUTH DAILY BEFORE BREAKFAST 30 Tab 6  
 liothyronine (CYTOMEL) 5 mcg tablet TAKE ONE TABLET BY MOUTH DAILY 30 Tab 1  
 SYNTHROID 100 mcg tablet TAKE ONE TABLET BY MOUTH DAILY BEFORE BREAKFAST 30 Tab 1  
 metFORMIN ER (GLUCOPHAGE XR) 500 mg tablet Take 1 Tab by mouth daily (with dinner). 90 Tab 1  
 rosuvastatin (CRESTOR) 5 mg tablet Take 1 Tab by mouth nightly. Start one night per week titrate up as tolerated 30 Tab 1  
 glucose blood VI test strips (FREESTYLE LITE STRIPS) strip Daily 10 Strip 0  
 aspirin 81 mg chewable tablet Take 1 Tab by mouth daily. 30 Tab 3  FERROUS FUMARATE/VIT BCOMP&C (SUPER B COMPLEX PO) Take  by mouth every other day.  cholecalciferol, vitamin D3, (VITAMIN D3) 2,000 unit Tab Take 2,000 Int'l Units by mouth Every Mon, Wed & Sun.    
 
Past Surgical History:  
Procedure Laterality Date  BREAST SURGERY PROCEDURE UNLISTED    
 breast biopsy- both breast  
  HX  SECTION    
 [place] COLONOSCOPY FLX DX W/COLLJ SPEC WHEN PFRMD    
 patient states done about 5 years ago Lab Results Component Value Date/Time WBC 3.3 (L) 01/10/2018 12:43 PM  
HGB 13.3 01/10/2018 12:43 PM  
HCT 40.0 01/10/2018 12:43 PM  
PLATELET 903 (H)  12:43 PM  
MCV 87 01/10/2018 12:43 PM  
 
Lab Results Component Value Date/Time Cholesterol, total 261 (H) 2018 12:34 PM  
HDL Cholesterol 89 2018 12:34 PM  
LDL, calculated 160 (H) 2018 12:34 PM  
LDL-C, External 157 10/07/2015 Triglyceride 60 2018 12:34 PM  
CHOL/HDL Ratio 3.1 2010 03:14 PM  
 
Lab Results Component Value Date/Time GFR est non-AA 67 2018 12:34 PM  
GFR est AA 77 2018 12:34 PM  
Creatinine 0.90 2018 12:34 PM  
BUN 16 2018 12:34 PM  
Sodium 140 2018 12:34 PM  
Potassium 5.3 (H) 2018 12:34 PM  
Chloride 100 2018 12:34 PM  
CO2 26 2018 12:34 PM  
  
 
Review of Systems Respiratory: Negative for shortness of breath. Cardiovascular: Negative for chest pain. Physical Exam  
Constitutional: She is oriented to person, place, and time. She appears well-developed and well-nourished. No distress. HENT:  
Head: Normocephalic and atraumatic. Mouth/Throat: Oropharynx is clear and moist. No oropharyngeal exudate. Eyes: Conjunctivae and EOM are normal. Right eye exhibits no discharge. Left eye exhibits no discharge. Neck: Normal range of motion. Neck supple. Cardiovascular: Normal rate, regular rhythm and normal heart sounds. Exam reveals no gallop and no friction rub. No murmur heard. Pulmonary/Chest: Effort normal and breath sounds normal. No respiratory distress. She has no wheezes. She has no rales. She exhibits no tenderness. Musculoskeletal: Normal range of motion. She exhibits no edema, tenderness or deformity. Lymphadenopathy:  
  She has no cervical adenopathy. Neurological: She is alert and oriented to person, place, and time. Coordination normal.  
Skin: Skin is warm and dry. No rash noted. She is not diaphoretic. No erythema. No pallor. Psychiatric: She has a normal mood and affect. Her behavior is normal.  
 
 
ASSESSMENT and PLAN 
  ICD-10-CM ICD-9-CM 1. Controlled type 2 diabetes mellitus without complication, without long-term current use of insulin ([address] Controlled on ozempic and metformin, home readings good, a1c at goal 
 E11.9 250.00   
2. Mixed hyperlipidemia Lipid lowering therapy not prescibed for medical reasons myalgias, working on w/l to improve cholesterol E78.2 272.2 3. Thyroid disease On synthroid and cytomel, TSH at goal on recent check E07.9 246.9 4. Obesity (BMI 30.0-34.9) On ozempic to assist with w/l, will be tapering up E66.9 278.00   
5. Hypovitaminosis D Low last check, recently restarted OTC vit D 
 E55.9 268.9 6. Elevated platelet count Very mild, a bit more elevated on recent labs with Giovannaros, will repeat CBC at f/u, discussed no further intervention needed d/t minimal elevation R79.89 790.6 Dep screen neg Scribed by Pradeep Mai of [address] [place] [address] [address], as dictated by Dr. Tiffany Rico. Current diagnosis and concerns discussed with pt at length. Pt understands risks and benefits or current treatment plan and medications, and accepts the treatment and medication with any possible risks. Pt asks appropriate questions, which were answered. Pt was instructed to call with any concerns or problems.

## 2018-10-10 NOTE — MR AVS SNAPSHOT
[address] [facility] [address] [facility] 83. 
[phone] Patient: Stanford Escudero MRN: [id number] XGO:[id number] Visit Information Date & Time Provider Department Dept. Phone Encounter #  
 10/10/2018  2:45 PM Gina Lugo, [address] [place] [address],[address] [phone] [id number] Follow-up Instructions Return in about 5 months (around 3/10/2019). Upcoming Health Maintenance Date Due Shingrix Vaccine Age 50> (1 of 2) 2/21/2002 Influenza Age 5 to Adult 5/20/2019* Pneumococcal 65+ Low/Medium Risk (2 of 2 - PPSV23) 1/10/2019 MICROALBUMIN Q1 1/10/2019 HEMOGLOBIN A1C Q6M 3/21/2019 EYE EXAM RETINAL OR DILATED Q1 3/28/2019 FOOT EXAM Q1 5/9/2019 LIPID PANEL Q1 5/9/2019 MEDICARE YEARLY EXAM 5/10/2019 GLAUCOMA SCREENING Q2Y 3/28/2020 BREAST CANCER SCRN MAMMOGRAM 7/24/2020 COLONOSCOPY 10/4/2026 DTaP/Tdap/Td series (2 - Td) 11/14/2026 *Topic was postponed. The date shown is not the original due date. Allergies as of 10/10/2018  Review Complete On: 10/10/2018 By: Gina Lugo MD  
  
 Severity Noted Reaction Type Reactions Aspirin  11/24/2010    Hives Nsaids (Non-steroidal Anti-inflammatory Drug)  11/24/2010    Hives Percocet [Oxycodone-acetaminophen]  08/15/2011   Systemic Swelling Wrong medication---patient says it was [facility] [place] Statins-hmg-coa Reductase Inhibitors  05/09/2018    Myalgia Current Immunizations  Never Reviewed No immunizations on file. Not reviewed this visit You Were Diagnosed With   
  
 Codes Comments Controlled type 2 diabetes mellitus without complication, without long-term current use of insulin ([facility]    -  Primary ICD-10-CM: E11.9 ICD-9-CM: 250.00 Mixed hyperlipidemia     ICD-10-CM: E78.2 ICD-9-CM: 272.2 Thyroid disease     ICD-10-CM: E07.9 ICD-9-CM: 246.9 Obesity (BMI 30.0-34.9)     ICD-10-CM: D27.1 ICD-9-CM: 278.00   
 Hypovitaminosis D     ICD-10-CM: E55.9 ICD-9-CM: 268.9 Elevated platelet count     XAW-85-MR: R79.89 ICD-9-CM: 790.6 Vitals BP Pulse Temp Resp Height(growth percentile) Weight(growth percentile) 109/68 (BP 1 Location: Left arm, BP Patient Position: Sitting) 68 97.9 °F (36.6 °C) (Oral) 16 5' 3\" (1.6 m) 192 lb (87.1 kg) SpO2 BMI OB Status Smoking Status 97% 34.01 kg/m2 Postmenopausal Former Smoker Vitals History BMI and BSA Data Body Mass Index Body Surface Area 34.01 kg/m 2 1.97 m 2 Preferred Pharmacy Pharmacy Name [facility], [address] [phone] Your Updated Medication List  
  
   
This list is accurate as of 10/10/18  3:25 PM.  Always use your most recent med list.  
  
  
  
  
 aspirin 81 mg chewable tablet Take 1 Tab by mouth daily. liothyronine 5 mcg tablet Commonly known as:  CYTOMEL  
TAKE ONE TABLET BY MOUTH DAILY  
  
 metFORMIN  mg tablet Commonly known as:  GLUCOPHAGE XR Take 1 Tab by mouth daily (with dinner). OZEMPIC 0.25 mg/0.2 mL (2 mg/1.5 mL) sub-q pen Generic drug:  semaglutide  
by SubCUTAneous route every seven (7) days. SUPER B COMPLEX PO Take  by mouth every other day. SYNTHROID 100 mcg tablet Generic drug:  levothyroxine TAKE ONE TABLET BY MOUTH DAILY BEFORE BREAKFAST  
  
 VITAMIN D3 2,000 unit Tab Generic drug:  cholecalciferol (vitamin D3) Take 2,000 Int'l Units by mouth Every Mon, Wed & Sun. Follow-up Instructions Return in about 5 months (around 3/10/2019). Introducing [facility]! Dear Stoney Gonzales: Thank you for requesting a [employer] account. Our records indicate that you already have an active [employer] account. You can access your account anytime at [email or web address] Did you know that you can access your hospital and ER discharge instructions at any time in [employer]? You can also review all of your test results from your hospital stay or ER visit. Additional Information If you have questions, please visit the Frequently Asked Questions section of the [employer] website at [email or web address] Remember, [employer] is NOT to be used for urgent needs. For medical emergencies, dial 911. Now available from your iPhone and Android! Please provide this summary of care documentation to your next provider. Your primary care clinician is listed as Magalis Victor. If you have any questions after today's visit, please call [phone].

## 2019-02-07 RX ORDER — LEVOTHYROXINE SODIUM 100 UG/1
TABLET ORAL
Qty: 30 TAB | Refills: 0 | Status: SHIPPED | OUTPATIENT
Start: 2019-02-07 | End: 2019-03-04 | Stop reason: SDUPTHER

## 2019-02-07 RX ORDER — LIOTHYRONINE SODIUM 5 UG/1
TABLET ORAL
Qty: 30 TAB | Refills: 0 | Status: SHIPPED | OUTPATIENT
Start: 2019-02-07 | End: 2019-03-04 | Stop reason: SDUPTHER

## 2019-03-04 RX ORDER — LEVOTHYROXINE SODIUM 100 UG/1
TABLET ORAL
Qty: 30 TAB | Refills: 0 | Status: SHIPPED | OUTPATIENT
Start: 2019-03-04 | End: 2019-04-01 | Stop reason: SDUPTHER

## 2019-03-04 RX ORDER — LIOTHYRONINE SODIUM 5 UG/1
TABLET ORAL
Qty: 30 TAB | Refills: 0 | Status: SHIPPED | OUTPATIENT
Start: 2019-03-04 | End: 2019-04-01 | Stop reason: SDUPTHER

## 2019-04-02 RX ORDER — LEVOTHYROXINE SODIUM 100 UG/1
TABLET ORAL
Qty: 30 TAB | Refills: 0 | Status: SHIPPED | OUTPATIENT
Start: 2019-04-02

## 2019-04-02 RX ORDER — LIOTHYRONINE SODIUM 5 UG/1
TABLET ORAL
Qty: 30 TAB | Refills: 0 | Status: SHIPPED | OUTPATIENT
Start: 2019-04-02

## 2019-04-11 LAB
CREATININE, EXTERNAL: 0.96
HBA1C MFR BLD HPLC: 5.9 %
LDL-C, EXTERNAL: 159

## 2019-05-01 ENCOUNTER — DOCUMENTATION ONLY (OUTPATIENT)
Dept: INTERNAL MEDICINE CLINIC | Age: 69
End: 2019-05-01

## 2019-05-01 NOTE — PROGRESS NOTES
Medicare Part B Preventive Services Guidelines/Limitations Date last completed and Frequency Due Date   Bone Mass Measurement  (age 72 & older, biennial) Requires diagnosis related to osteoporosis or estrogen deficiency. Biennial benefit unless patient has history of long-term glucocorticoid tx or baseline is needed because initial test was by other method Completed 7/2018      Recommended every 2 years Due 7/2019   Cardiovascular Screening Blood Tests (every 5 years)  Total cholesterol, HDL, Triglycerides Order as a panel if possible Completed 5/2018      Recommended annually Due now   Colorectal Cancer Screening  -Fecal occult blood test (annual)  -Flexible sigmoidoscopy (5y)  -Screening colonoscopy (10y)  -Barium Enema    Completed 10/04/16 with Dr. Brady Drake  Recommended repeat in 10 years  Due 10/2026   Counseling to Prevent Tobacco Use (up to 8 sessions per year)  - Counseling greater than 3 and up to 10 minutes  - Counseling greater than 10 minutes Patients must be asymptomatic of tobacco-related conditions to receive as preventive service N/A N/A   Diabetes Screening Tests (at least every 3 years, Medicare covers annually or at 6-month intervals for prediabetic patients)      Fasting blood sugar (FBS) or glucose tolerance test (GTT) Patient must be diagnosed with one of the following:  -Hypertension, Dyslipidemia, obesity, previous impaired FBS or GTT  Or any two of the following: overweight, FH of diabetes, age ? 72, history of gestational diabetes, birth of baby weighing more than 9 pounds Completed 9/2018 with A1C 5.8      Recommended every 3-6 months for Pre-Diabetics and Diabetics Due now   Diabetes Self-Management Training (DSMT) (no USPSTF recommendation) Requires referral by treating physician for patient with diabetes or renal disease. 10 hours of initial DSMT session of no less than 30 minutes each in a continuous 12-month period. 2 hours of follow-up DSMT in subsequent years.  Completed in the past You are eligible for this class every 2 years. Please let us know if you are interested. Glaucoma Screening (no USPSTF recommendation) Diabetes mellitus, family history, , age 48 or over,  American, age 72 or over Completed 3/2018      Recommended annually Due now   Human Immunodeficiency Virus (HIV) Screening (annually for increased risk patients)  HIV-1 and HIV-2 by EIA, ARIE, rapid antibody test, or oral mucosa transudate Patient must be at increased risk for HIV infection per USPSTF guidelines or pregnant. Tests covered annually for patients at increased risk. Pregnant patients may receive up to 3 test during pregnancy. N/A N/A   Medical Nutrition Therapy (MNT) (for diabetes or renal disease not recommended schedule) Requires referral by treating physician for patient with diabetes or renal disease. Can be provided in same year as diabetes self-management training (DSMT), and CMS recommends medical nutrition therapy take place after DSMT. Up to 3 hours for initial year and 2 hours in subsequent years. N/A N/A         Seasonal Influenza Vaccination (annually)    Never completed      Recommended annually Declines   Pneumococcal Vaccination (once after 72)    Pneumococcal 23 - never completed       Prevnar 15 -  never completed      Both recommended once over the age of 72 Declines        Declines    Hepatitis B Vaccinations (if medium/high risk) Medium/high risk factors: End-stage renal disease,  Hemophiliacs who received Factor VIII or IX concentrates, Clients of institutions for the mentally retarded, Persons who live in the same house as a HepB virus carrier, Homosexual men, Illicit injectable drug abusers. N/A N/A   Screening Mammography (biennial age 54-69)?  Annually (age 36 or over) Completed 7/2018      Recommended annually Due 7/2019    Screening Pap Tests and Pelvic Examination (up to age 79 and after 79 if unknown history or abnormal study last 10 years) Every 24 months except high risk Completed 7/2017      Recommended every other year Due 7/2019   Ultrasound Screening for Abdominal Aortic Aneurysm (AAA) (once) Patient must be referred through [facility] and not have had a screening for abdominal aortic aneurysm before under Medicare.  Limited to patients who meet one of the following criteria:  - Men who are 73-68 years old and have smoked more than 100 cigarettes in their lifetime.  -Anyone with a FH of AAA  -Anyone recommended for screening by USPSTF N/A N/A

## 2019-05-03 ENCOUNTER — OFFICE VISIT (OUTPATIENT)
Dept: INTERNAL MEDICINE CLINIC | Age: 69
End: 2019-05-03

## 2019-05-03 VITALS
OXYGEN SATURATION: 95 % | TEMPERATURE: 98 F | HEIGHT: 63 IN | RESPIRATION RATE: 16 BRPM | HEART RATE: 87 BPM | WEIGHT: 193 LBS | SYSTOLIC BLOOD PRESSURE: 109 MMHG | BODY MASS INDEX: 34.2 KG/M2 | DIASTOLIC BLOOD PRESSURE: 68 MMHG

## 2019-05-03 DIAGNOSIS — E66.9 OBESITY (BMI 30.0-34.9): ICD-10-CM

## 2019-05-03 DIAGNOSIS — E11.9 CONTROLLED TYPE 2 DIABETES MELLITUS WITHOUT COMPLICATION, WITHOUT LONG-TERM CURRENT USE OF INSULIN (HCC): Primary | ICD-10-CM

## 2019-05-03 DIAGNOSIS — E55.9 HYPOVITAMINOSIS D: ICD-10-CM

## 2019-05-03 DIAGNOSIS — E07.9 THYROID DISEASE: ICD-10-CM

## 2019-05-03 DIAGNOSIS — Z00.00 MEDICARE ANNUAL WELLNESS VISIT, SUBSEQUENT: ICD-10-CM

## 2019-05-03 DIAGNOSIS — E78.2 MIXED HYPERLIPIDEMIA: ICD-10-CM

## 2019-05-03 LAB
ALBUMIN UR QL STRIP: 10 MG/L
CREATININE, URINE POC: 200 MG/DL
MICROALBUMIN/CREAT RATIO POC: <30 MG/G

## 2019-05-03 NOTE — PROGRESS NOTES
This is the Subsequent Medicare Annual Wellness Exam, performed 12 months or more after the Initial AWV or the last Subsequent AWV I have reviewed the patient's medical history in detail and updated the computerized patient record. History Past Medical History:  
Diagnosis Date  Fibrocystic breast   
 Headache(784.0)  Hypercholesterolemia  Hypovitaminosis D  Shingles  Shingles 3/2011  Thyroid disease Past Surgical History:  
Procedure Laterality Date  BREAST SURGERY PROCEDURE UNLISTED    
 breast biopsy- both breast  
 HX  SECTION    
 [place] COLONOSCOPY FLX DX W/COLLJ SPEC WHEN PFRMD    
 patient states done about 5 years ago Current Outpatient Medications Medication Sig Dispense Refill  varicella-zoster recombinant, PF, (SHINGRIX, PF,) 50 mcg/0.5 mL susr injection 0.5mL by IntraMUSCular route once now and then repeat in 2-6 months 0.5 mL 1  
 SYNTHROID 100 mcg tablet TAKE ONE TABLET BY MOUTH DAILY BEFORE BREAKFAST 30 Tab 0  
 liothyronine (CYTOMEL) 5 mcg tablet TAKE ONE TABLET BY MOUTH DAILY 30 Tab 0  
 metFORMIN ER (GLUCOPHAGE XR) 500 mg tablet Take 1 Tab by mouth daily (with dinner). 90 Tab 1  
 aspirin 81 mg chewable tablet Take 1 Tab by mouth daily. 30 Tab 3  FERROUS FUMARATE/VIT BCOMP&C (SUPER B COMPLEX PO) Take  by mouth every other day.  cholecalciferol, vitamin D3, (VITAMIN D3) 2,000 unit Tab Take 2,000 Int'l Units by mouth Every Mon, Wed & Sun. Allergies Allergen Reactions  Aspirin Hives  Nsaids (Non-Steroidal Anti-Inflammatory Drug) Hives  Percocet [Oxycodone-Acetaminophen] Swelling Wrong medication---patient says it was PERCODAN  
 Statins-Hmg-Coa Reductase Inhibitors Myalgia Family History Problem Relation Age of Onset  Hypertension Mother  Arthritis-rheumatoid Brother Social History Tobacco Use  Smoking status: Former Smoker Packs/day: 0.25 Last attempt to quit: 2/15/2010 Years since quittin.2  Smokeless tobacco: Never Used Substance Use Topics  Alcohol use: Yes Comment: occasional  
 
Patient Active Problem List  
Diagnosis Code  Mixed hyperlipidemia E78.2  Thyroid disease E07.9  Headache R51  Fibrocystic breast N60.19  
 Hypovitaminosis D E55.9  
 Encounter for long-term (current) use of other medications Z79.899  Shingles B02.9  Controlled type 2 diabetes mellitus without complication, without long-term current use of insulin (HCC) E11.9  Obesity (BMI 30.0-34. 9) E66.9 Depression Risk Factor Screening:  
 
3 most recent PHQ Screens 5/3/2019 Little interest or pleasure in doing things Not at all Feeling down, depressed, irritable, or hopeless Not at all Total Score PHQ 2 0 Alcohol Risk Factor Screening: You do not drink alcohol or very rarely. Functional Ability and Level of Safety:  
Hearing Loss Hearing is good. Activities of Daily Living The home contains: no safety equipment. Patient does total self care Fall Risk Fall Risk Assessment, last 12 mths 5/3/2019 Able to walk? Yes Fall in past 12 months? No  
 
 
Abuse Screen Patient is not abused Lives with  safe Cognitive Screening Evaluation of Cognitive Function: 
Has your family/caregiver stated any concerns about your memory: no 
Normal 
 
Patient Care Team  
Patient Care Team: 
Rahel Golden MD as PCP - General (Internal Medicine) Reyna Collins MD (Endocrinology) Nilton Borden MD (Orthopedic Surgery) Kingsley Patton MD (Gastroenterology) Nilay Bee NP (Obstetrics & Gynecology) Kaden Gold MD (Ophthalmology)  
updated Assessment/Plan Education and counseling provided: 
Are appropriate based on today's review and evaluation Screening Mammography Screening Pap and pelvic (covered once every 2 years) Screening for glaucoma Diabetes screening test 
 
Diagnoses and all orders for this visit: 
 
 1. Controlled type 2 diabetes mellitus without complication, without long-term current use of insulin ([facility] 2. Obesity (BMI 30.0-34.9) 3. Mixed hyperlipidemia 4. Hypovitaminosis D 
 
5. Thyroid disease 6. Medicare annual wellness visit, subsequent Other orders 
-     varicella-zoster recombinant, PF, (SHINGRIX, PF,) 50 mcg/0.5 mL susr injection; 0.5mL by IntraMUSCular route once now and then repeat in 2-6 months Health Maintenance Due Topic Date Due  Shingrix Vaccine Age 50> (1 of 2) 2002  Pneumococcal 65+ years (1 of 2 - PCV13) 2017  MICROALBUMIN Q1  01/10/2019  
 HEMOGLOBIN A1C Q6M  2019  
 FOOT EXAM Q1  2019  MEDICARE YEARLY EXAM  05/10/2019  
 EYE EXAM RETINAL OR DILATED  2019 Discussed with patient about advance medical directive. Provided patient blank AMD and Your Right to Decide Booklet. Requested that if completed to provide a copy of AMD to office. ACP not on file. SDM is her .  
Provided information in the past.  
 
Colonoscopy: Dr. Lane Cordoba, 10/04/16, repeat 10 years Pap: TORRI Owen,  due this summer [facility]amm[facility]: [facility], 18,due  DEXA: 18, normal due  AAA: denies fmhx A1c:   5.9 [id number] Eye exam: Dr. Arturo Aquino at [facility], 19  annual 
Retinal screen: 19, no diabetic retinopathy Lipids:     annual 
Hep C screen: , negative EK17, nsr   
Declines all immunizations Medication reconciliation completed by MA and reviewed by me. Medical/surgical/social/family history reviewed and updated by me. Patient provided AVS and preventative screening table. Patient verbalized understanding of all information discussed.

## 2019-05-03 NOTE — PATIENT INSTRUCTIONS
Medicare Part B Preventive Services Guidelines/Limitations Date last completed and Frequency Due Date Bone Mass Measurement 
(age 72 & older, biennial) Requires diagnosis related to osteoporosis or estrogen deficiency. Biennial benefit unless patient has history of long-term glucocorticoid tx or baseline is needed because initial test was by other method Completed 7/2018 
   
Recommended every 2 years Due 7/2019 Cardiovascular Screening Blood Tests (every 5 years) Total cholesterol, HDL, Triglycerides Order as a panel if possible Completed 4/2019 
   
Recommended annually Due 4/2020 Colorectal Cancer Screening 
-Fecal occult blood test (annual) -Flexible sigmoidoscopy (5y) 
-Screening colonoscopy (10y) -Barium Enema    Completed 10/04/16 with Dr. Joi Demarco 
Recommended repeat in 10 years  [facility] [id number] Counseling to Prevent Tobacco Use (up to 8 sessions per year) - Counseling greater than 3 and up to 10 minutes - Counseling greater than 10 minutes Patients must be asymptomatic of tobacco-related conditions to receive as preventive service N/A N/A Diabetes Screening Tests (at least every 3 years, Medicare covers annually or at 6-month intervals for prediabetic patients) 
   
Fasting blood sugar (FBS) or glucose tolerance test (GTT) Patient must be diagnosed with one of the following: 
-Hypertension, Dyslipidemia, obesity, previous impaired FBS or GTT 
Or any two of the following: overweight, FH of diabetes, age ? 72, history of gestational diabetes, birth of baby weighing more than 9 pounds Completed 4/2019 
   
Recommended every 3-6 months for Pre-Diabetics and Diabetics Due 7/2019 - 10/2019 Diabetes Self-Management Training (DSMT) (no USPSTF recommendation) Requires referral by treating physician for patient with diabetes or renal disease. 10 hours of initial DSMT session of no less than 30 minutes each in a continuous 12-month period.  2 hours of follow-up DSMT in subsequent years. Completed in the past You are eligible for this class every 2 years. Please let us know if you are interested. Glaucoma Screening (no USPSTF recommendation) Diabetes mellitus, family history, , age 48 or over,  American, age 72 or over Completed 2/2019 
   
Recommended annually Due 2/2020 Human Immunodeficiency Virus (HIV) Screening (annually for increased risk patients) HIV-1 and HIV-2 by EIA, ARIE, rapid antibody test, or oral mucosa transudate Patient must be at increased risk for HIV infection per USPSTF guidelines or pregnant. Tests covered annually for patients at increased risk. Pregnant patients may receive up to 3 test during pregnancy. N/A N/A Medical Nutrition Therapy (MNT) (for diabetes or renal disease not recommended schedule) Requires referral by treating physician for patient with diabetes or renal disease. Can be provided in same year as diabetes self-management training (DSMT), and CMS recommends medical nutrition therapy take place after DSMT. Up to 3 hours for initial year and 2 hours in subsequent years. N/A N/A  
         
Seasonal Influenza Vaccination (annually)    Never completed  
  
Recommended annually Declines Pneumococcal Vaccination (once after 72)    Pneumococcal 23 - never completed  
   
Prevnar 15 -  never completed  
  
Both recommended once over the age of 72 Declines 
  
  
Declines   
Hepatitis B Vaccinations (if medium/high risk) Medium/high risk factors: End-stage renal disease, Hemophiliacs who received Factor VIII or IX concentrates, Clients of institutions for the mentally retarded, Persons who live in the same house as a HepB virus carrier, Homosexual men, Illicit injectable drug abusers. N/A N/A Screening Mammography (biennial age 54-69)?  Annually (age 36 or over) Completed 7/2018 
   
Recommended annually Due 7/2019   
Screening Pap Tests and Pelvic Examination (up to age 79 and after 79 if unknown history or abnormal study last 10 years) Every 24 months except high risk Completed 7/2017 
   
Recommended every other year Due 7/2019 Ultrasound Screening for Abdominal Aortic Aneurysm (AAA) (once) Patient must be referred through [facility] and not have had a screening for abdominal aortic aneurysm before under Medicare. Limited to patients who meet one of the following criteria: 
- Men who are 73-68 years old and have smoked more than 100 cigarettes in their lifetime. 
-Anyone with a FH of AAA 
-Anyone recommended for screening by USPSTF N/A N/A  
  
  
 
Medicare Wellness Visit, Female The best way to live healthy is to have a lifestyle where you eat a well-balanced diet, exercise regularly, limit alcohol use, and quit all forms of tobacco/nicotine, if applicable. Regular preventive services are another way to keep healthy. Preventive services (vaccines, screening tests, monitoring & exams) can help personalize your care plan, which helps you manage your own care. Screening tests can find health problems at the earliest stages, when they are easiest to treat. [facility] follows the current, evidence-based guidelines published by the [facility] (USPSTF) when recommending preventive services for our patients. Because we follow these guidelines, sometimes recommendations change over time as research supports it. (For example, mammograms used to be recommended annually. Even though Medicare will still pay for an annual mammogram, the newer guidelines recommend a mammogram every two years for women of average risk.) Of course, you and your doctor may decide to screen more often for some diseases, based on your risk and your health status. Preventive services for you include: - Medicare offers their members a free annual wellness visit, which is time for you and your primary care provider to discuss and plan for your preventive service needs. Take advantage of this benefit every year! 
-All adults over the age of 72 should receive the recommended pneumonia vaccines. Current USPSTF guidelines recommend a series of two vaccines for the best pneumonia protection.  
-All adults should have a flu vaccine yearly and a tetanus vaccine every 10 years. All adults age 61 and older should receive a shingles vaccine once in their lifetime.   
-A bone mass density test is recommended when a woman turns 65 to screen for osteoporosis. This test is only recommended one time, as a screening. Some providers will use this same test as a disease monitoring tool if you already have osteoporosis. -All adults age 38-68 who are overweight should have a diabetes screening test once every three years.  
-Other screening tests and preventive services for persons with diabetes include: an eye exam to screen for diabetic retinopathy, a kidney function test, a foot exam, and stricter control over your cholesterol.  
-Cardiovascular screening for adults with routine risk involves an electrocardiogram (ECG) at intervals determined by your doctor.  
-Colorectal cancer screenings should be done for adults age 54-65 with no increased risk factors for colorectal cancer. There are a number of acceptable methods of screening for this type of cancer. Each test has its own benefits and drawbacks. Discuss with your doctor what is most appropriate for you during your annual wellness visit. The different tests include: colonoscopy (considered the best screening method), a fecal occult blood test, a fecal DNA test, and sigmoidoscopy.  
-Breast cancer screenings are recommended every other year for women of normal risk, age 54-69. 
-Cervical cancer screenings for women over age 72 are only recommended with certain risk factors.  
-All adults [facility] should be screened once for Hepatitis C.  
 
 Here is a list of your current Health Maintenance items (your personalized list of preventive services) with a due date: 
Health Maintenance Due Topic Date Due  Shingles Vaccine (1 of 2) 02/21/2002  Pneumococcal Vaccine (1 of 2 - PCV13) 02/21/2017  Albumin Urine Test  01/10/2019  Hemoglobin A1C    03/22/2019 [facility] Diabetic Foot Care  05/09/2019 [facility] Annual Well Visit  05/10/2019 [facility] Eye Exam  05/13/2019

## 2019-05-03 NOTE — PROGRESS NOTES
HISTORY OF PRESENT ILLNESS 
Ed Sherly is a 81 y.o. female. HPI Last here 10/10/18. Pt is here to f/u on chronic conditions. Pt presents with her . 
Markel George 
BP today is 109/68 Not monitoring her BP at home She is not currently taking BP medications ACE Inhibitor or ARB therapy not prescibed for patient reasons--not interested with nl bp . 
   
BS at home running around 75, 81 in the AM fasting No longer on ozempic, states she did not like sticking herself Continues metformin XR 500mg once daily She also takes ASA 81mg daily She has been to a diabetic education class with her  in the past 
   
Wt today is 194 lbs --up 2 lbs x lov Pt now has an elliptical x 2 weeks She also has a Fitbit Discussed seeing a nutritionist, pt is not interested Discussed diet and w/l  
   
Reviewed labs 4/19 per Maria Elena: kidney function nl, liver test nl, , a1c 5.9, thyroid function at goal vit D at goal 
   
Pt follows with Dr. Karl Hampton (endo) for her thyroid and diabetes Last visit was 4/19 Continues synthroid 100mcg and cytomel 5mg daily She takes these medications together each AM  
  
Pt saw with Dr. Angel Mata (ortho) for shoulder pain Last visit was 3/17 Her sx resolved off crestor She has not had to f/u with this ortho 
   
Pt is not taking crestor for her cholesterol Recall pt had myalgias on crestor, taking this 1-2x weekly did not help 
  
Continues on vit D Reviewed mammo 7/18: nl 
 
Lives with her , this is a safe environment Fully functional independently 
  
ACP not on file. SDM is her .  
Provided information today.  
   
PREVENTIVE:  
Colonoscopy: Dr. Nyla Sahu, 10/04/16, repeat 10 years Pap: TORRI Owen, 7/17 Mammogram: [facility], 7/23/18,negative, fmhx breast cancer--sister age 52, benign breast bx DEXA: 7/23/18, normal 
AAA: denies fmhx Foot exam: 05/03/19 Microalbumin: 1/18, protein in the urine, ordered 05/03/19 A1c: 3/13, 6.0, 3/15 6.1, 10/15 5.9,  6.0,  6.2,  6.0,  6.2,  6.0,  5.9,  5.8,  5.9 Eye exam: Dr. Markel Jennings at [facility], 19 Retinal screen: 17, no diabetic retinopathy Lipids:    Hep C screen: , negative EK17, nsr   
Declines all immunizations Patient Active Problem List  
 Diagnosis Date Noted  Obesity (BMI 30.0-34.9) 2018  Controlled type 2 diabetes mellitus without complication, without long-term current use of insulin ([facility] [address] 2016  Shingles 2013  Encounter for long-term (current) use of other medications 08/15/2011  Mixed hyperlipidemia 2010  Thyroid disease  Headache  Fibrocystic breast   
 Hypovitaminosis D Current Outpatient Medications Medication Sig Dispense Refill  
 SYNTHROID 100 mcg tablet TAKE ONE TABLET BY MOUTH DAILY BEFORE BREAKFAST 30 Tab 0  
 liothyronine (CYTOMEL) 5 mcg tablet TAKE ONE TABLET BY MOUTH DAILY 30 Tab 0  
 semaglutide (OZEMPIC) 0.25 mg/0.2 mL (2 mg/1.5 mL) sub-q pen by SubCUTAneous route every seven (7) days.  metFORMIN ER (GLUCOPHAGE XR) 500 mg tablet Take 1 Tab by mouth daily (with dinner). 90 Tab 1  
 aspirin 81 mg chewable tablet Take 1 Tab by mouth daily. 30 Tab 3  FERROUS FUMARATE/VIT BCOMP&C (SUPER B COMPLEX PO) Take  by mouth every other day.  cholecalciferol, vitamin D3, (VITAMIN D3) 2,000 unit Tab Take 2,000 Int'l Units by mouth Every Mon, Wed & Sun.    
 
Past Surgical History:  
Procedure Laterality Date  BREAST SURGERY PROCEDURE UNLISTED    
 breast biopsy- both breast  
 HX  SECTION    
 IN COLONOSCOPY FLX DX W/COLLJ SPEC WHEN PFRMD    
 patient states done about 5 years ago Lab Results Component Value Date/Time WBC 3.3 (L) 01/10/2018 12:43 PM  
 HGB 13.3 01/10/2018 12:43 PM  
 HCT 40.0 01/10/2018 12:43 PM  
 PLATELET 296 (H)  12:43 PM  
 MCV 87 01/10/2018 12:43 PM  
 
Lab Results Component Value Date/Time Cholesterol, total 261 (H) 05/09/2018 12:34 PM  
 HDL Cholesterol 89 05/09/2018 12:34 PM  
 LDL, calculated 160 (H) 05/09/2018 12:34 PM  
 LDL-C, External 157 10/07/2015 Triglyceride 60 05/09/2018 12:34 PM  
 CHOL/HDL Ratio 3.1 01/20/2010 03:14 PM  
 
Lab Results Component Value Date/Time GFR est non-AA 67 05/09/2018 12:34 PM  
 GFR est AA 77 05/09/2018 12:34 PM  
 Creatinine 0.90 05/09/2018 12:34 PM  
 BUN 16 05/09/2018 12:34 PM  
 Sodium 140 05/09/2018 12:34 PM  
 Potassium 5.3 (H) 05/09/2018 12:34 PM  
 Chloride 100 05/09/2018 12:34 PM  
 CO2 26 05/09/2018 12:34 PM  
  
Review of Systems Respiratory: Negative for shortness of breath. Cardiovascular: Negative for chest pain. Physical Exam  
Constitutional: She is oriented to person, place, and time. She appears well-developed and well-nourished. No distress. HENT:  
Head: Normocephalic and atraumatic. Right Ear: External ear normal.  
Left Ear: External ear normal.  
Mouth/Throat: Oropharynx is clear and moist. No oropharyngeal exudate. Eyes: Conjunctivae and EOM are normal. Right eye exhibits no discharge. Left eye exhibits no discharge. Neck: Normal range of motion. Neck supple. No carotid bruits Cardiovascular: Normal rate, regular rhythm, normal heart sounds and intact distal pulses. Exam reveals no gallop and no friction rub. No murmur heard. Pulmonary/Chest: Effort normal and breath sounds normal. No respiratory distress. She has no wheezes. She has no rales. She exhibits no tenderness. Abdominal: Soft. She exhibits no distension and no mass. There is no tenderness. There is no rebound and no guarding. Musculoskeletal: Normal range of motion. She exhibits no edema, tenderness or deformity. Lymphadenopathy:  
  She has no cervical adenopathy. Neurological: She is alert and oriented to person, place, and time. Coordination normal.  
Monofilament nl BLE, good peripheral pulses, no ulcers Skin: Skin is warm and dry. No rash noted. She is not diaphoretic. No erythema. No pallor. Psychiatric: She has a normal mood and affect. Her behavior is normal.  
 
 
ASSESSMENT and PLAN 
  ICD-10-CM ICD-9-CM 1. Controlled type 2 diabetes mellitus without complication, without long-term current use of insulin ([facility] [address] Follows with Dr Tex Souza, a1c at goal, micro checked today, no longer on ozempic weekly, just on metformin E11.9 250.00   
2. Obesity (BMI 30.0-34. 9) Continue to work on portion control, not interested in nutritionist 
 E66.9 278.00   
3. Mixed hyperlipidemia Lipid lowering therapy not prescibed for patient reasons myalgias. E78.2 272.2 4. Hypovitaminosis D Check level and treat prn 
 E55.9 268.9   
5. Thyroid disease On synthroid and cytomel, check TSH 
 E07.9 246.9 Depression screen reviewed and negative. Scribed by Ricardo Gomes of [address] [place] [address] [address], as dictated by Dr. Bryan Berrios. Current diagnosis and concerns discussed with pt at length. Pt understands risks and benefits or current treatment plan and medications, and accepts the treatment and medication with any possible risks. Pt asks appropriate questions, which were answered. Pt was instructed to call with any concerns or problems. I have reviewed the note documented by the scribe. The services provided are my own. The documentation is accurate

## 2019-10-09 LAB
CREATININE, EXTERNAL: 0.96
HBA1C MFR BLD HPLC: 5.8 %

## 2019-11-05 ENCOUNTER — OFFICE VISIT (OUTPATIENT)
Dept: INTERNAL MEDICINE CLINIC | Age: 69
End: 2019-11-05

## 2019-11-05 VITALS
HEIGHT: 63 IN | TEMPERATURE: 97.7 F | BODY MASS INDEX: 34.55 KG/M2 | HEART RATE: 76 BPM | SYSTOLIC BLOOD PRESSURE: 129 MMHG | WEIGHT: 195 LBS | OXYGEN SATURATION: 96 % | DIASTOLIC BLOOD PRESSURE: 76 MMHG | RESPIRATION RATE: 16 BRPM

## 2019-11-05 DIAGNOSIS — E11.9 CONTROLLED TYPE 2 DIABETES MELLITUS WITHOUT COMPLICATION, WITHOUT LONG-TERM CURRENT USE OF INSULIN (HCC): Primary | ICD-10-CM

## 2019-11-05 DIAGNOSIS — E55.9 HYPOVITAMINOSIS D: ICD-10-CM

## 2019-11-05 DIAGNOSIS — E66.9 OBESITY (BMI 30.0-34.9): ICD-10-CM

## 2019-11-05 DIAGNOSIS — E78.2 MIXED HYPERLIPIDEMIA: ICD-10-CM

## 2019-11-05 DIAGNOSIS — E03.9 ACQUIRED HYPOTHYROIDISM: ICD-10-CM

## 2019-11-05 NOTE — PROGRESS NOTES
HISTORY OF PRESENT ILLNESS  Babar Song is a 81 y.o. female. HPI   Last here5/3/19. Pt is here to f/u on chronic conditions. Pt presents with her .  Johnetta Landau  BP today is 129/76   Not monitoring her BP at home, 110/70 at Dr Bruno Gonzalez   She is not currently taking BP medications   ACE Inhibitor or ARB therapy not prescibed for patient reasons--not interested with nl bp .     She is diabetic   BS at home running around 80s in the AM fasted   No longer on ozempic, states she did not like sticking herself  Continues metformin XR 500mg now 2 per day per dr William Butler   She also takes ASA 81mg daily   She has been to a diabetic education class with her  in the past    Pt follows with Dr. Bruno Gonzalez (endo) for her thyroid and diabetes  Last visit was 10/19   Continues synthroid 100mcg and cytomel 5mg daily  She takes these medications together each AM       Wt today is 195 lbs -- stable x lov   Pt states she has not been focusing on this much recently due to caring for her mother   Discussed diet and wt loss       Reviewed labs 10/8/19 per dr William Butler  cr nl ldl 141, a1c 5.8, tsh nl, vit d nl        Pt saw with Dr. Nuria Rodriguez (ortho) for shoulder pain  Last visit was 3/17  Her sx resolved off crestor   She has not had to f/u with this ortho    Pt went to ortho a couple of weeks ago for swelling and pain in R ankle   States she was told she had a ganglion cyst   Now resolved       Pt is not taking crestor for her cholesterol  Recall pt had myalgias on crestor, taking this 1-2x weekly did not help     Continues on vit D    Pt states she recently had dental work done and had ligament shaved down   Having pain again with this   Will f/u with dentist for this         ACP not on file.  SDM is her .   Provided information today.       PREVENTIVE:   Colonoscopy: Dr. Sho Loomis, 10/04/16, repeat 10 years  Pap: TORRI Owen, 9/27/19   [facility]ammogram: [facility], 9/27/19, will call for report  fmhx breast cancer--sister age 52, benign breast bx  DEXA: 18, normal  AAA: denies fmhx   Foot exam: 19   Microalbumin: , none   A1c: ,  6.0,  6.2,  6.0,  6.2,  6.0,  5.9,  5.8,  5.9, 10/19 5.8 per blue   Eye exam: [facility]. Joanne Brown at [facility], 19, will call for notes   Retinal screen: 17, no diabetic retinopathy   Lipids: 10/19   per kapros   Hep C screen: , negative   EK17, nsr    Declines all immunizations    Patient Active Problem List    Diagnosis Date Noted    Obesity (BMI 30.0-34.9) 2018    Controlled type 2 diabetes mellitus without complication, without long-term current use of insulin ([facility] [address] 2016    Shingles 2013    Encounter for long-term (current) use of other medications 08/15/2011    Mixed hyperlipidemia 2010    Thyroid disease     Headache     Fibrocystic breast     Hypovitaminosis D      Current Outpatient Medications   Medication Sig Dispense Refill    varicella-zoster recombinant, PF, (SHINGRIX, PF,) 50 mcg/0.5 mL susr injection 0.5mL by IntraMUSCular route once now and then repeat in 2-6 months 0.5 mL 1    SYNTHROID 100 mcg tablet TAKE ONE TABLET BY MOUTH DAILY BEFORE BREAKFAST 30 Tab 0    liothyronine (CYTOMEL) 5 mcg tablet TAKE ONE TABLET BY MOUTH DAILY 30 Tab 0    metFORMIN ER (GLUCOPHAGE XR) 500 mg tablet Take 1 Tab by mouth daily (with dinner). 90 Tab 1    aspirin 81 mg chewable tablet Take 1 Tab by mouth daily. 30 Tab 3    FERROUS FUMARATE/VIT BCOMP&C (SUPER B COMPLEX PO) Take  by mouth every other day.       cholecalciferol, vitamin D3, (VITAMIN D3) 2,000 unit Tab Take 2,000 Int'l Units by mouth Every Mon, Wed & Sun.       Past Surgical History:   Procedure Laterality Date    BREAST SURGERY PROCEDURE UNLISTED      breast biopsy- both breast    HX  SECTION      MI COLONOSCOPY FLX DX W/COLLJ SPEC WHEN PFRMD      patient states done about 5 years ago      Lab Results   Component Value Date/Time    WBC 3.3 (L) 01/10/2018 12:43 PM    HGB 13.3 01/10/2018 12:43 PM    HCT 40.0 01/10/2018 12:43 PM    PLATELET 860 (H) 72/52/3672 12:43 PM    MCV 87 01/10/2018 12:43 PM     Lab Results   Component Value Date/Time    Cholesterol, total 261 (H) 05/09/2018 12:34 PM    HDL Cholesterol 89 05/09/2018 12:34 PM    LDL, calculated 160 (H) 05/09/2018 12:34 PM    LDL-C, External 159 04/11/2019    Triglyceride 60 05/09/2018 12:34 PM    CHOL/HDL Ratio 3.1 01/20/2010 03:14 PM     Lab Results   Component Value Date/Time    GFR est non-AA 67 05/09/2018 12:34 PM    GFR est AA 77 05/09/2018 12:34 PM    Creatinine 0.90 05/09/2018 12:34 PM    BUN 16 05/09/2018 12:34 PM    Sodium 140 05/09/2018 12:34 PM    Potassium 5.3 (H) 05/09/2018 12:34 PM    Chloride 100 05/09/2018 12:34 PM    CO2 26 05/09/2018 12:34 PM        Review of Systems   Respiratory: Negative for shortness of breath. Cardiovascular: Negative for chest pain. Physical Exam   Constitutional: She is oriented to person, place, and time. She appears well-developed and well-nourished. No distress. HENT:   Head: Normocephalic and atraumatic. Mouth/Throat: Oropharynx is clear and moist. No oropharyngeal exudate. Eyes: Conjunctivae and EOM are normal. Right eye exhibits no discharge. Left eye exhibits no discharge. Neck: Normal range of motion. Neck supple. Cardiovascular: Normal rate, regular rhythm and normal heart sounds. Exam reveals no gallop and no friction rub. No murmur heard. Pulmonary/Chest: Effort normal and breath sounds normal. No respiratory distress. She has no wheezes. She has no rales. She exhibits no tenderness. Musculoskeletal: Normal range of motion. She exhibits no edema, tenderness or deformity. Lymphadenopathy:     She has no cervical adenopathy. Neurological: She is alert and oriented to person, place, and time. Coordination normal.   Skin: Skin is warm and dry. No rash noted. She is not diaphoretic. No erythema. No pallor.    Psychiatric: She has a normal mood and affect. Her behavior is normal.       ASSESSMENT and PLAN    ICD-10-CM ICD-9-CM    1. Controlled type 2 diabetes mellitus without complication, without long-term current use of insulin (HCC)            Controlled on metformin now taking 2 per day a1c at goal utd with dr Shaka Morillo no change to meds  E11.9 250.00    2. Obesity (BMI 30.0-34. 9)                Discussed diet wt loss portion control discussed reducing calories by 500 per day  E66.9 278.00    3. Mixed hyperlipidemia        Intolerant of statins ldl above goal working on diet and wt loss  E78.2 272.2    4. Hypovitaminosis D                  Taking otc vit d E55.9 268.9    5. Acquired hypothyroidism                  On cytomel and synthroid at goal on oct labs  E03.9 244.9           Depression screen reviewed and negative. Scribed by Nydia Ragland of [facility], as dictated by Dr. Layton Lopez. Current diagnosis and concerns discussed with pt at length. Pt understands risks and benefits or current treatment plan and medications, and accepts the treatment and medication with any possible risks. Pt asks appropriate questions, which were answered. Pt was instructed to call with any concerns or problems. I have reviewed the note documented by the scribe. The services provided are my own.   The documentation is accurate

## 2019-11-26 RX ORDER — METFORMIN HYDROCHLORIDE 500 MG/1
500 TABLET, EXTENDED RELEASE ORAL
Qty: 90 TAB | Refills: 1 | Status: SHIPPED | OUTPATIENT
Start: 2019-11-26

## 2019-11-26 NOTE — TELEPHONE ENCOUNTER
PCP: Farideh Suarez MD    Last appt: 11/5/2019  Future Appointments   Date Time Provider Melissa Rosales   5/5/2020  2:45 PM Farideh Suarez MD [facility]       Requested Prescriptions     Pending Prescriptions Disp Refills    metFORMIN ER (GLUCOPHAGE XR) 500 mg tablet 90 Tab 1

## 2020-05-26 NOTE — TELEPHONE ENCOUNTER
Patient is due now  She canceled several weeks ago    Her  also needs f/u at same time    All appointments are virtual     Have her schedule thanks

## 2020-06-03 RX ORDER — METFORMIN HYDROCHLORIDE 500 MG/1
TABLET, EXTENDED RELEASE ORAL
Qty: 90 TAB | Refills: 1 | OUTPATIENT
Start: 2020-06-03

## 2020-07-31 NOTE — PROGRESS NOTES
HISTORY OF PRESENT ILLNESS  Marge Vallejo is a 78 y.o. female. HPI     Last here 11/5/19Pt is here to f/u on chronic conditions. This is an established visit completed with telemedicine was completed with video assist  the patient acknowledges and agrees to this method of visitation lexiymcalin      BP at home has been 110/70 118/71  She is not currently taking BP medications   ACE Inhibitor or ARB therapy not prescibed for patient reasons--not interested with nl bp .     She is diabetic   BS at home running around 80  No longer on ozempic, states she did not like sticking herself  Continues metformin XR 500mg now 2 per day per dr Yarelis Worrell   She also takes ASA 81mg daily      Pt follows with Dr. Yocasta Becerril (endo) for her thyroid and diabetes  Last visit was 5/20: A1c 5.9pt reports, will get report  Continues synthroid 100mcg and cytomel 5mg daily      Wt today is 184 lbs pt reports -- down 10 lbs x lov   She states she has been eating less and is busy taking care of her mom  Discussed diet and wt loss       Reviewed labs  Ordered labs     Pt saw with Dr. Gusman Brochdennis (ortho) for shoulder pain  Last visit was 3/17  Her sx resolved off crestor   She has not had to f/u with this ortho  Last visit with Dr. Rajendra zapata 10/19 for ankle pain     Recall pt had myalgias on crestor, taking this 1-2x weekly did not help     Continues on vit D     Discussed staying inside and maintaining social distancing with mom in house    She denies falls  She denies depression  She drinks alcohol occasionally  No safety equip  No problems with hearing  Pt lives with      Pt is functionally independent       No memory concerns   Knows the month, date, year, location   Can recall 3/3 objects     ACP not on file.  SDM is her .   Provided information today.       PREVENTIVE:   Colonoscopy: Dr. Adonis Gibbons, 10/04/16, repeat 10 years  Pap: TORRI Owen, 9/27/19, scheduled 10/20  Mammogram: [facility], 9/27/19, scheduled 10/20 fmhx breast cancer--sister age 52, benign breast bx  DEXA: 18, normal  AAA: denies fmhx   Foot exam: 19   Microalbumin: , none   A1c: ,  6.0,  6.2,  6.0,  6.2,  6.0,  5.9,  5.8,  5.9, 10/19 5.8 per williamsros  5.9 blue pt reports  Eye exam: [facility]. Hu Blind at [facility],  will call for report  Retinal screen: 17, no diabetic retinopathy   Lipids: 10/19   per blue   Hep C screen: , negative   EK17, nsr    Declines all immunizations    Patient Active Problem List    Diagnosis Date Noted    Obesity (BMI 30.0-34.9) 2018    Controlled type 2 diabetes mellitus without complication, without long-term current use of insulin ([facility] [address] 2016    Shingles 2013    Encounter for long-term (current) use of other medications 08/15/2011    Mixed hyperlipidemia 2010    Thyroid disease     Headache     Fibrocystic breast     Hypovitaminosis D      Current Outpatient Medications   Medication Sig Dispense Refill    metFORMIN ER (GLUCOPHAGE XR) 500 mg tablet Take 1 Tab by mouth daily (with dinner). 90 Tab 1    SYNTHROID 100 mcg tablet TAKE ONE TABLET BY MOUTH DAILY BEFORE BREAKFAST 30 Tab 0    FERROUS FUMARATE/VIT BCOMP&C (SUPER B COMPLEX PO) Take  by mouth every other day.  cholecalciferol, vitamin D3, (VITAMIN D3) 2,000 unit Tab Take 2,000 Int'l Units by mouth Every Mon, Wed & Sun.      liothyronine (CYTOMEL) 5 mcg tablet TAKE ONE TABLET BY MOUTH DAILY 30 Tab 0    aspirin 81 mg chewable tablet Take 1 Tab by mouth daily.  30 Tab 3     Past Surgical History:   Procedure Laterality Date    BREAST SURGERY PROCEDURE UNLISTED      breast biopsy- both breast    HX  SECTION      [facility] COLONOSCOPY FLX DX W/COLLJ SPEC WHEN PFRMD      patient states done about 5 years ago      Lab Results   Component Value Date/Time    WBC 3.3 (L) 01/10/2018 12:43 PM    HGB 13.3 01/10/2018 12:43 PM    HCT 40.0 01/10/2018 12:43 PM    PLATELET 227 (H)  12:43 PM    MCV 87 01/10/2018 12:43 PM     Lab Results   Component Value Date/Time    Cholesterol, total 261 (H) 05/09/2018 12:34 PM    HDL Cholesterol 89 05/09/2018 12:34 PM    LDL, calculated 160 (H) 05/09/2018 12:34 PM    LDL-C, External 159 04/11/2019    Triglyceride 60 05/09/2018 12:34 PM    CHOL/HDL Ratio 3.1 01/20/2010 03:14 PM     Lab Results   Component Value Date/Time    GFR est non-AA 67 05/09/2018 12:34 PM    GFR est AA 77 05/09/2018 12:34 PM    Creatinine 0.90 05/09/2018 12:34 PM    BUN 16 05/09/2018 12:34 PM    Sodium 140 05/09/2018 12:34 PM    Potassium 5.3 (H) 05/09/2018 12:34 PM    Chloride 100 05/09/2018 12:34 PM    CO2 26 05/09/2018 12:34 PM        Review of Systems   Respiratory: Negative for shortness of breath. Cardiovascular: Negative for chest pain. Physical Exam  Constitutional:       Appearance: Normal appearance. She is not toxic-appearing or diaphoretic. HENT:      Head: Normocephalic and atraumatic. Eyes:      Conjunctiva/sclera: Conjunctivae normal.      Pupils: Pupils are equal, round, and reactive to light. Pulmonary:      Effort: Pulmonary effort is normal. No respiratory distress. Neurological:      Mental Status: She is alert and oriented to person, place, and time. Mental status is at baseline. Gait: Gait normal.   Psychiatric:         Mood and Affect: Mood normal.         Behavior: Behavior normal.         ASSESSMENT and PLAN    ICD-10-CM ICD-9-CM    1. Controlled type 2 diabetes mellitus without complication, without long-term current use of insulin (HCC)     Controlled 2 metformin per day will get report from Dr. Eric Rodgers office E11.9 250.00    2. Medicare annual wellness visit, subsequent  Z00.00 V70.0    3. Encounter for screening mammogram for malignant neoplasm of breast  Z12.31 V76.12 [facility] MAMMO BI SCREENING INCL CAD   4. Postmenopausal state  Z78.0 V49.81 DEXA BONE DENSITY STUDY AXIAL   5. Obesity (BMI 30.0-34. 9)     Working on portion control down 10 pounds E66.9 278.00    6. Mixed hyperlipidemia     Intolerant of statins working on weight loss E78.2 272.2    7. Hypovitaminosis D     Vitamin D for treatment E55.9 268.9    8. Thyroid disease     On Synthroid and Cytomel check TSH adjust dose as needed E07.9 246.9       Depression screen reviewed and negative      Scribed by Tristen Rae of [address] [place] [address] [address], as dictated by Dr. Suki Farias. Current diagnosis and concerns discussed with pt at length. Pt understands risks and benefits or current treatment plan and medications, and accepts the treatment and medication with any possible risks. Pt asks appropriate questions, which were answered. Pt was instructed to call with any concerns or problems. I have reviewed the note documented by the scribe. The services provided are my own. The documentation is accurate     Savanna Venegas, who was evaluated through a synchronous (real-time) audio-video encounter, and/or her healthcare decision maker, is aware that it is a billable service, with coverage as determined by her insurance carrier. She provided verbal consent to proceed: Yes, and patient identification was verified. It was conducted pursuant to the emergency declaration under the [facility], [address] [place] [address] authority and the [employer] General Act. A caregiver was present when appropriate. Ability to conduct physical exam was limited. I was at home. The patient was at home.

## 2020-08-03 ENCOUNTER — VIRTUAL VISIT (OUTPATIENT)
Dept: INTERNAL MEDICINE CLINIC | Age: 70
End: 2020-08-03
Payer: MEDICARE

## 2020-08-03 DIAGNOSIS — E78.2 MIXED HYPERLIPIDEMIA: ICD-10-CM

## 2020-08-03 DIAGNOSIS — E07.9 THYROID DISEASE: ICD-10-CM

## 2020-08-03 DIAGNOSIS — E55.9 HYPOVITAMINOSIS D: ICD-10-CM

## 2020-08-03 DIAGNOSIS — E11.9 CONTROLLED TYPE 2 DIABETES MELLITUS WITHOUT COMPLICATION, WITHOUT LONG-TERM CURRENT USE OF INSULIN (HCC): Primary | ICD-10-CM

## 2020-08-03 DIAGNOSIS — E66.9 OBESITY (BMI 30.0-34.9): ICD-10-CM

## 2020-08-03 DIAGNOSIS — Z78.0 POSTMENOPAUSAL STATE: ICD-10-CM

## 2020-08-03 DIAGNOSIS — Z12.31 ENCOUNTER FOR SCREENING MAMMOGRAM FOR MALIGNANT NEOPLASM OF BREAST: ICD-10-CM

## 2020-08-03 DIAGNOSIS — Z00.00 MEDICARE ANNUAL WELLNESS VISIT, SUBSEQUENT: ICD-10-CM

## 2020-08-03 PROCEDURE — G0444 DEPRESSION SCREEN ANNUAL: HCPCS | Performed by: INTERNAL MEDICINE

## 2020-08-03 PROCEDURE — G0439 PPPS, SUBSEQ VISIT: HCPCS | Performed by: INTERNAL MEDICINE

## 2020-08-03 PROCEDURE — 99213 OFFICE O/P EST LOW 20 MIN: CPT | Performed by: INTERNAL MEDICINE

## 2020-08-03 NOTE — PROGRESS NOTES
This is the Subsequent Medicare Annual Wellness Exam, performed 12 months or more after the Initial AWV or the last Subsequent AWV    I have reviewed the patient's medical history in detail and updated the computerized patient record. History     Patient Active Problem List   Diagnosis Code    Mixed hyperlipidemia E78.2    Thyroid disease E07.9    Headache R51    Fibrocystic breast N60.19    Hypovitaminosis D E55.9    Encounter for long-term (current) use of other medications Z79.899    Shingles B02.9    Controlled type 2 diabetes mellitus without complication, without long-term current use of insulin (HCC) E11.9    Obesity (BMI 30.0-34. 9) E66.9     Past Medical History:   Diagnosis Date    Fibrocystic breast     Headache(784.0)     Hypercholesterolemia     Hypovitaminosis D     Shingles     Shingles 3/2011    Thyroid disease       Past Surgical History:   Procedure Laterality Date    BREAST SURGERY PROCEDURE UNLISTED      breast biopsy- both breast    HX  SECTION      KS COLONOSCOPY FLX DX W/COLLJ SPEC WHEN PFRMD      patient states done about 5 years ago     Current Outpatient Medications   Medication Sig Dispense Refill    metFORMIN ER (GLUCOPHAGE XR) 500 mg tablet Take 1 Tab by mouth daily (with dinner). 90 Tab 1    varicella-zoster recombinant, PF, (SHINGRIX, PF,) 50 mcg/0.5 mL susr injection 0.5mL by IntraMUSCular route once now and then repeat in 2-6 months 0.5 mL 1    SYNTHROID 100 mcg tablet TAKE ONE TABLET BY MOUTH DAILY BEFORE BREAKFAST 30 Tab 0    liothyronine (CYTOMEL) 5 mcg tablet TAKE ONE TABLET BY MOUTH DAILY 30 Tab 0    aspirin 81 mg chewable tablet Take 1 Tab by mouth daily. 30 Tab 3    FERROUS FUMARATE/VIT BCOMP&C (SUPER B COMPLEX PO) Take  by mouth every other day.  cholecalciferol, vitamin D3, (VITAMIN D3) 2,000 unit Tab Take 2,000 Int'l Units by mouth Every Mon, Wed & Sun.        Allergies   Allergen Reactions    Aspirin Hives    Nsaids (Non-Steroidal Anti-Inflammatory Drug) Hives    Percocet [Oxycodone-Acetaminophen] Swelling     Wrong medication---patient says it was PERCODAN    Statins-Hmg-Coa Reductase Inhibitors Myalgia       Family History   Problem Relation Age of Onset    Hypertension Mother     Arthritis-rheumatoid Brother      Social History     Tobacco Use    Smoking status: Former Smoker     Packs/day: 0.25     Last attempt to quit: 2/15/2010     Years since quitting: 10.4    Smokeless tobacco: Never Used   Substance Use Topics    Alcohol use: Yes     Comment: occasional       Depression Risk Factor Screening:     3 most recent PHQ Screens 8/3/2020   Little interest or pleasure in doing things Not at all   Feeling down, depressed, irritable, or hopeless Not at all   Total Score PHQ 2 0       Alcohol Risk Factor Screening:   Do you average 1 drink per night or more than 7 drinks a week:  No    On any one occasion in the past three months have you have had more than 3 drinks containing alcohol:  No  occasional    Functional Ability and Level of Safety:   Hearing: Hearing is good. Activities of Daily Living: The home contains: no safety equipment. Patient does total self care     Ambulation: with no difficulty     Fall Risk:  Fall Risk Assessment, last 12 mths 8/3/2020   Able to walk? Yes   Fall in past 12 months?  No     Abuse Screen:  Patient is not abused     Lives w , mother there too since march   Cognitive Screening   Has your family/caregiver stated any concerns about your memory: no    Cognitive Screening: Normal - Mini Cog Test      No memory concerns   Pt knows the month, day and year   Can recall 3/3 objects   Patient Care Team   Patient Care Team:  Tatum Leiva MD as PCP - General (Internal Medicine)  Tatum Leiva MD as PCP - [facility] Empaneled Provider  Francesca Resendez MD (Endocrinology)  Adithya Hall MD (Orthopedic Surgery)  Flora Meyer MD (Gastroenterology)  Elham Hunt NP (Obstetrics & Gynecology)  Latasha Rios MD (Ophthalmology)  Stephanie Peters RD (Optometry)  Lin Kayser, MD (Orthopedic Surgery)   Updated    Assessment/Plan   Education and counseling provided:  Are appropriate based on today's review and evaluation  End-of-Life planning (with patient's consent)  Pneumococcal Vaccine  Influenza Vaccine  Screening Mammography  Screening Pap and pelvic (covered once every 2 years)  Cardiovascular screening blood test  Bone mass measurement (DEXA)  Screening for glaucoma  Diabetes screening test    Diagnoses and all orders for this visit:    1. Medicare annual wellness visit, subsequent    2. Encounter for screening mammogram for malignant neoplasm of breast  -     SORAIDA MAMMO BI SCREENING INCL CAD; Future    3. Postmenopausal state  -     DEXA BONE DENSITY STUDY AXIAL; Future        Health Maintenance Due   Topic Date Due    Shingrix Vaccine Age 49> (1 of 2) 2002    Medicare Yearly Exam  2020    Foot Exam Q1  2020    MICROALBUMIN Q1  2020    Influenza Age 9 to Adult  2020       ACP not on file. SDM is her .   Provided information today.         Colonoscopy: Dr. Tina Sullivan, 10/04/16, repeat 10 years  Pap: NP Renea Owen, 19, scheduled 10/20  [facility]ammogram: [facility], [facility], scheduled 10/20 fmhx breast cancer--sister age 52, benign breast bx--annual  DEXA: 18, normal due now ordered  AAA: denies fmhx     e   A1c:   5.9 kapros pt reports every 3 months  [facility] exam: [facility]riti [facility] at [facility],  will call for report annual    Lipids: 10/19   per kapros  annual  Hep C screen: , negative   EK17, nsr      Declines all immunizations    Medication reconciliation completed by MA and reviewed by me. Medical/surgical/social/family history reviewed and updated by me. Patient provided AVS and preventative screening table. Patient verbalized understanding of all information discussed.           Deacon Isaacs, who was evaluated through a synchronous (real-time) audio-video encounter, and/or her healthcare decision maker, is aware that it is a billable service, with coverage as determined by her insurance carrier. She provided verbal consent to proceed: Yes, and patient identification was verified. It was conducted pursuant to the emergency declaration under the [address] [place] [address] and the [employer] General Act. A caregiver was present when appropriate. Ability to conduct physical exam was limited. I was at home. The patient was at home.     Nancy Kinney MD

## 2020-08-03 NOTE — PATIENT INSTRUCTIONS
Medicare Wellness Visit, Female The best way to live healthy is to have a lifestyle where you eat a well-balanced diet, exercise regularly, limit alcohol use, and quit all forms of tobacco/nicotine, if applicable. Regular preventive services are another way to keep healthy. Preventive services (vaccines, screening tests, monitoring & exams) can help personalize your care plan, which helps you manage your own care. Screening tests can find health problems at the earliest stages, when they are easiest to treat. [facility] follows the current, evidence-based guidelines published by the [facility] Griffin ([facility]) when recommending preventive services for our patients. Because we follow these guidelines, sometimes recommendations change over time as research supports it. (For example, mammograms used to be recommended annually. Even though Medicare will still pay for an annual mammogram, the newer guidelines recommend a mammogram every two years for women of average risk). Of course, you and your doctor may decide to screen more often for some diseases, based on your risk and your co-morbidities (chronic disease you are already diagnosed with). Preventive services for you include: - Medicare offers their members a free annual wellness visit, which is time for you and your primary care provider to discuss and plan for your preventive service needs. Take advantage of this benefit every year! 
-All adults over the age of 72 should receive the recommended pneumonia vaccines. Current USPSTF guidelines recommend a series of two vaccines for the best pneumonia protection.  
-All adults should have a flu vaccine yearly and a tetanus vaccine every 10 years.  
-All adults age 48 and older should receive the shingles vaccines (series of two vaccines). -All adults age 38-68 who are overweight should have a diabetes screening test once every three years. -All adults born between [birth year] and 1965 should be screened once for Hepatitis C. 
-Other screening tests and preventive services for persons with diabetes include: an eye exam to screen for diabetic retinopathy, a kidney function test, a foot exam, and stricter control over your cholesterol.  
-Cardiovascular screening for adults with routine risk involves an electrocardiogram (ECG) at intervals determined by your doctor.  
-Colorectal cancer screenings should be done for adults age 54-65 with no increased risk factors for colorectal cancer. There are a number of acceptable methods of screening for this type of cancer. Each test has its own benefits and drawbacks. Discuss with your doctor what is most appropriate for you during your annual wellness visit. The different tests include: colonoscopy (considered the best screening method), a fecal occult blood test, a fecal DNA test, and sigmoidoscopy. 
 
-A bone mass density test is recommended when a woman turns 65 to screen for osteoporosis. This test is only recommended one time, as a screening. Some providers will use this same test as a disease monitoring tool if you already have osteoporosis. -Breast cancer screenings are recommended every other year for women of normal risk, age 54-69. 
-Cervical cancer screenings for women over age 72 are only recommended with certain risk factors. Here is a list of your current Health Maintenance items (your personalized list of preventive services) with a due date: 
Health Maintenance Due Topic Date Due  Shingles Vaccine (1 of 2) 02/21/2002 [address] [place] [address] Annual Well Visit  05/03/2020 [address] [place] [address] Diabetic Foot Care  05/03/2020  Albumin Urine Test  05/03/2020  Flu Vaccine  08/01/2020

## 2020-08-06 DIAGNOSIS — Z12.31 ENCOUNTER FOR SCREENING MAMMOGRAM FOR MALIGNANT NEOPLASM OF BREAST: ICD-10-CM

## 2020-08-06 DIAGNOSIS — Z78.0 POSTMENOPAUSAL STATE: ICD-10-CM

## 2021-02-02 ENCOUNTER — TELEPHONE (OUTPATIENT)
Dept: INTERNAL MEDICINE CLINIC | Age: 71
End: 2021-02-02

## 2021-02-02 NOTE — TELEPHONE ENCOUNTER
#[phone] OR  #[phone]  [facility] states they did not know they had appts made for 2-3-21. Please call to reschedule in office visit as soon as you can as this date does not work for pt.

## 2021-02-16 NOTE — PROGRESS NOTES
HISTORY OF PRESENT ILLNESS  José Luis Gilmore is a 78 y.o. female. HPI     Last here 8/3/20 Pt is here to f/u on chronic conditions. This is an established visit completed with telemedicine was completed with video assist  the patient acknowledges and agrees to this method of visitation doxyme      BP is controlled 110/70   She is not currently taking BP medications   ACE Inhibitor or ARB therapy not prescibed for patient reasons--not interested with nl bp .     She is diabetic   BS 65 70 80   No longer on ozempic, states she did not like sticking herself  Continues metformin XR 500mg now 2 per day per dr Lori Morris  She also takes ASA 81mg daily      Pt follows with Dr. Tim Funez (endo) for her thyroid and diabetes  Last visit was 1/20 A1c 5.8, complete labs  Continues synthroid 100mcg and cytomel 5mg daily      Wt is 187 lbs per pt - up 3 lbs x lov   Discussed diet and wt loss       Completed labs with blue 1/21 will get report  reviewed labs from [address] [place] 9/1/20 a1c 5.8      Pt saw with Dr. Bethany Manley (ortho) for shoulder pain  Last visit was 3/17  Her sx resolved off crestor   She has not had to f/u with this ortho  Last visit with Dr. Rajendra zapata 10/19 for ankle pain   No issues currently, has not needed to f/u     Recall pt had myalgias on crestor      Continues on vit D    Discussed covid vaccine     ACP not on file.  SDM is her .   Provided information today.       PREVENTIVE:   Colonoscopy: Dr. Corinne Raja, 10/04/16, repeat 10 years  Pap: TORRI Owen, 11/9/20  Mammogram: [facility],10/28/20 will get report--sister age 52, benign breast bx  DEXA: 7/23/18, normal  AAA: denies fmhx   Foot exam: 05/03/19   Microalbumin: 5/19, none --completed more recently at the endocrinology office will get notes  A1c: ,, 1/18 6.0, 5/18 5.9, 9/18 5.8, 4/19 5.9, 10/19 5.8 per kapros 5/20 5.9 kapros pt reports 9/20 5.8 1/21 5.8 kapros  [facility] exam: [facility] Opal Jaime at [facility], 7/20    Retinal screen: 5/12/17, no diabetic retinopathy Lipids: 10/19   per blue   Hep C screen: , negative   EK17, nsr    Declines all immunizations    Patient Active Problem List    Diagnosis Date Noted    Obesity (BMI 30.0-34.9) 2018    Controlled type 2 diabetes mellitus without complication, without long-term current use of insulin ([facility] [address] 2016    Shingles 2013    Encounter for long-term (current) use of other medications 08/15/2011    Mixed hyperlipidemia 2010    Thyroid disease     Headache     Fibrocystic breast     Hypovitaminosis D      Current Outpatient Medications   Medication Sig Dispense Refill    metFORMIN ER (GLUCOPHAGE XR) 500 mg tablet Take 1 Tab by mouth daily (with dinner). 90 Tab 1    SYNTHROID 100 mcg tablet TAKE ONE TABLET BY MOUTH DAILY BEFORE BREAKFAST 30 Tab 0    liothyronine (CYTOMEL) 5 mcg tablet TAKE ONE TABLET BY MOUTH DAILY 30 Tab 0    aspirin 81 mg chewable tablet Take 1 Tab by mouth daily. 30 Tab 3    FERROUS FUMARATE/VIT BCOMP&C (SUPER B COMPLEX PO) Take  by mouth every other day.       cholecalciferol, vitamin D3, (VITAMIN D3) 2,000 unit Tab Take 2,000 Int'l Units by mouth Every Mon, Wed & Sun.       Past Surgical History:   Procedure Laterality Date    BREAST SURGERY PROCEDURE UNLISTED      breast biopsy- both breast    HX  SECTION      DE COLONOSCOPY FLX DX W/COLLJ SPEC WHEN PFRMD      patient states done about 5 years ago      Lab Results   Component Value Date/Time    WBC 3.3 (L) 01/10/2018 12:43 PM    HGB 13.3 01/10/2018 12:43 PM    HCT 40.0 01/10/2018 12:43 PM    PLATELET 778 (H) 7240 12:43 PM    MCV 87 01/10/2018 12:43 PM     Lab Results   Component Value Date/Time    Cholesterol, total 261 (H) 2018 12:34 PM    HDL Cholesterol 89 2018 12:34 PM    LDL, calculated 160 (H) 2018 12:34 PM    LDL-C, External 133 2020 11:35 AM    Triglyceride 60 2018 12:34 PM    CHOL/HDL Ratio 3.1 2010 03:14 PM     Lab Results   Component Value Date/Time    GFR est non-AA 67 05/09/2018 12:34 PM    GFR est AA 77 05/09/2018 12:34 PM    Creatinine 0.90 05/09/2018 12:34 PM    BUN 16 05/09/2018 12:34 PM    Sodium 140 05/09/2018 12:34 PM    Potassium 5.3 (H) 05/09/2018 12:34 PM    Chloride 100 05/09/2018 12:34 PM    CO2 26 05/09/2018 12:34 PM        Review of Systems   Respiratory: Negative for shortness of breath. Cardiovascular: Negative for chest pain. Physical Exam  Constitutional:       General: She is not in acute distress. Appearance: Normal appearance. She is not ill-appearing, toxic-appearing or diaphoretic. HENT:      Head: Normocephalic and atraumatic. Eyes:      General:         Right eye: No discharge. Left eye: No discharge. Conjunctiva/sclera: Conjunctivae normal.   Pulmonary:      Effort: No respiratory distress. Neurological:      Mental Status: She is alert and oriented to person, place, and time. Mental status is at baseline. Gait: Gait normal.   Psychiatric:         Mood and Affect: Mood normal.         Behavior: Behavior normal.         ASSESSMENT and PLAN    ICD-10-CM ICD-9-CM    1. Controlled type 2 diabetes mellitus without complication, without long-term current use of insulin (HCC)      Controlled on metformin 500 mg 2 of these per day    Home readings good A1c at goal      E11.9 250.00    2. Obesity (BMI 30.0-34. 9)       Working on portions diabetic friendly diet     E66.9 278.00    3. Thyroid disease       Controlled on Synthroid 100 mcg and Cytomel 5 mg TSH checked endocrinology will get report for review E07.9 246.9    4. Mixed hyperlipidemia       Intolerant of statins diet controlled E78.2 272.2    5. Hypovitaminosis D       Stable on over-the-counter vitamin D E55.9 268.9       Depression screen reviewed and negative      Scribed by Sabine Brito of [facility], as dictated by Dr. Kaur Vázquez. Current diagnosis and concerns discussed with pt at length.  Pt understands risks and benefits or current treatment plan and medications, and accepts the treatment and medication with any possible risks. Pt asks appropriate questions, which were answered. Pt was instructed to call with any concerns or problems. I have reviewed the note documented by the scribe. The services provided are my own. The documentation is accurate     Jazmyne Gallegos, who was evaluated through a synchronous (real-time) audio-video encounter, and/or her healthcare decision maker, is aware that it is a billable service, with coverage as determined by her insurance carrier. She provided verbal consent to proceed: Yes, and patient identification was verified. It was conducted pursuant to the emergency declaration under the [facility], [address] [place] [address] authority and the [employer] General Act. A caregiver was present when appropriate. Ability to conduct physical exam was limited. I was at home. The patient was at home.

## 2021-02-19 ENCOUNTER — VIRTUAL VISIT (OUTPATIENT)
Dept: INTERNAL MEDICINE CLINIC | Age: 71
End: 2021-02-19
Payer: MEDICARE

## 2021-02-19 DIAGNOSIS — E11.9 CONTROLLED TYPE 2 DIABETES MELLITUS WITHOUT COMPLICATION, WITHOUT LONG-TERM CURRENT USE OF INSULIN (HCC): Primary | ICD-10-CM

## 2021-02-19 DIAGNOSIS — E66.9 OBESITY (BMI 30.0-34.9): ICD-10-CM

## 2021-02-19 DIAGNOSIS — E55.9 HYPOVITAMINOSIS D: ICD-10-CM

## 2021-02-19 DIAGNOSIS — E78.2 MIXED HYPERLIPIDEMIA: ICD-10-CM

## 2021-02-19 DIAGNOSIS — E07.9 THYROID DISEASE: ICD-10-CM

## 2021-02-19 PROCEDURE — 99213 OFFICE O/P EST LOW 20 MIN: CPT | Performed by: INTERNAL MEDICINE

## 2021-02-19 PROCEDURE — G9899 SCRN MAM PERF RSLTS DOC: HCPCS | Performed by: INTERNAL MEDICINE

## 2021-02-19 PROCEDURE — G8510 SCR DEP NEG, NO PLAN REQD: HCPCS | Performed by: INTERNAL MEDICINE

## 2021-02-19 PROCEDURE — G8399 PT W/DXA RESULTS DOCUMENT: HCPCS | Performed by: INTERNAL MEDICINE

## 2021-02-19 PROCEDURE — 3046F HEMOGLOBIN A1C LEVEL >9.0%: CPT | Performed by: INTERNAL MEDICINE

## 2021-02-19 PROCEDURE — 3017F COLORECTAL CA SCREEN DOC REV: CPT | Performed by: INTERNAL MEDICINE

## 2021-02-19 PROCEDURE — 2022F DILAT RTA XM EVC RTNOPTHY: CPT | Performed by: INTERNAL MEDICINE

## 2021-02-19 PROCEDURE — 1101F PT FALLS ASSESS-DOCD LE1/YR: CPT | Performed by: INTERNAL MEDICINE

## 2021-02-19 PROCEDURE — 1090F PRES/ABSN URINE INCON ASSESS: CPT | Performed by: INTERNAL MEDICINE

## 2021-02-19 PROCEDURE — G8427 DOCREV CUR MEDS BY ELIG CLIN: HCPCS | Performed by: INTERNAL MEDICINE

## 2021-03-30 ENCOUNTER — TELEPHONE (OUTPATIENT)
Dept: INTERNAL MEDICINE CLINIC | Age: 71
End: 2021-03-30

## 2021-03-30 NOTE — TELEPHONE ENCOUNTER
----- Message from Sonny Mosley sent at 3/30/2021 12:50 PM EDT -----  Regarding: Dr. Era Moon: [phone]  General Message/Vendor Calls    Caller's first and last name: N/A      Reason for call: Requesting callback as soon as possible.       Callback required yes/no and why: Yes/details not provided      Best contact number(s): [phone]      Details to clarify the request: N/A      Message from [facility]

## 2021-04-01 NOTE — TELEPHONE ENCOUNTER
Called, spoke to pt  Received two pt identifiers  Pt inquiring if it is okay if her  Elisa Pimentel Verified) can still take his flexeril and otc ibuprofen day of Covid shot 04/03/21. Elizalde informed per Dr. Dave Sullivan Mr Shaheed Garcia can continue taking thos medications as no contraindications have been noted. Pt verbalizes understanding of the instructions and has no further questions at this time.

## 2021-04-03 ENCOUNTER — IMMUNIZATION (OUTPATIENT)
Dept: INTERNAL MEDICINE CLINIC | Age: 71
End: 2021-04-03
Payer: MEDICARE

## 2021-04-03 DIAGNOSIS — Z23 ENCOUNTER FOR IMMUNIZATION: Primary | ICD-10-CM

## 2021-04-03 PROCEDURE — 0001A COVID-19, MRNA, LNP-S, PF, 30MCG/0.3ML DOSE(PFIZER): CPT | Performed by: FAMILY MEDICINE

## 2021-04-03 PROCEDURE — 91300 COVID-19, MRNA, LNP-S, PF, 30MCG/0.3ML DOSE(PFIZER): CPT | Performed by: FAMILY MEDICINE

## 2021-04-24 ENCOUNTER — IMMUNIZATION (OUTPATIENT)
Dept: INTERNAL MEDICINE CLINIC | Age: 71
End: 2021-04-24
Payer: MEDICARE

## 2021-04-24 DIAGNOSIS — Z23 ENCOUNTER FOR IMMUNIZATION: Primary | ICD-10-CM

## 2021-04-24 PROCEDURE — 0002A COVID-19, MRNA, LNP-S, PF, 30MCG/0.3ML DOSE(PFIZER): CPT | Performed by: FAMILY MEDICINE

## 2021-04-24 PROCEDURE — 91300 COVID-19, MRNA, LNP-S, PF, 30MCG/0.3ML DOSE(PFIZER): CPT | Performed by: FAMILY MEDICINE

## 2021-08-30 NOTE — PROGRESS NOTES
HISTORY OF PRESENT ILLNESS  Alice Fong is a 73 y.o. female. HPI     Last here 21. Pt is here to f/u on chronic conditions.      BP today is 127/63  BP at home 115-120/70  She is not currently taking BP medications   ACE Inhibitor or ARB therapy not prescibed for patient reasons--not interested with nl bp .     She is diabetic   BS 80s or 90s generally has been well controlled  No longer on ozempic  Continues metformin XR 500mg  2 per day per Dr. Juan Krishnamurthy  She also takes ASA 81mg daily      Pt follows with Dr. Sarah Valles (endo) for her thyroid and diabetes  Last visit was   Sarah Valles labs: kidney function good, , A1c 5.9, TSH 0.94, Vit D 23 (low)      Now taking 1000U daily of vitamin D      Continues synthroid 100mcg and cytomel 5mg daily      Wt is 188 lbs       Reviewed labs  Ordered labs     Pt saw with Dr. Jackson Necessary (ortho) for shoulder pain  Last visit was 3/17  Her sx resolved off crestor   She has not had to f/u with this ortho  Last visit with Dr. Gertrudis zapata 10/19 for ankle pain   No issues currently, has not needed to f/u      Recall pt had myalgias on crestor      Continues on vit D    Occasionally drinks alcohol  Pt lives with her      Pt is functionally independent       No memory concerns   Knows the month, date, year, location   Can recall 3/3 objects     ACP not on file.  SDM is her .   Provided information today.       PREVENTIVE:   Colonoscopy: Dr. Elizabeth Larios, 10/04/16, repeat 10 years  Pap: TORRI Owen, 22  [facility]ammogram: [facility],10/28/20 will get report--sister age 52, benign breast bx  DEXA: 18, normal ordered  AAA: denies fmhx   Foot exam: 21  Microalbumin:   A1c:  5.8,  5.9, 10/19 5.8 per kapros  5.9 kapros pt  reports  5.8  5.8 kapros  5.9 kapros  Eye exam: Hemanth ORDOÑEZ, , due  Lipids:   per kapros   Hep C screen: , negative   EK17, nsr    Covid: both (pfizer)  Declines all immunizations except covid      Patient Active Problem List    Diagnosis Date Noted    Obesity (BMI 30.0-34.9) 2018    Controlled type 2 diabetes mellitus without complication, without long-term current use of insulin ([facility] [address] 2016    Shingles 2013    Encounter for long-term (current) use of other medications 08/15/2011    Mixed hyperlipidemia 2010    Thyroid disease     Headache     Fibrocystic breast     Hypovitaminosis D      Current Outpatient Medications   Medication Sig Dispense Refill    metFORMIN ER (GLUCOPHAGE XR) 500 mg tablet Take 1 Tab by mouth daily (with dinner). 90 Tab 1    SYNTHROID 100 mcg tablet TAKE ONE TABLET BY MOUTH DAILY BEFORE BREAKFAST 30 Tab 0    liothyronine (CYTOMEL) 5 mcg tablet TAKE ONE TABLET BY MOUTH DAILY 30 Tab 0    aspirin 81 mg chewable tablet Take 1 Tab by mouth daily. 30 Tab 3    FERROUS FUMARATE/VIT BCOMP&C (SUPER B COMPLEX PO) Take  by mouth every other day.       cholecalciferol, vitamin D3, (VITAMIN D3) 2,000 unit Tab Take 2,000 Int'l Units by mouth Every Mon, Wed & Sun.       Past Surgical History:   Procedure Laterality Date    HX  SECTION      AL BREAST SURGERY PROCEDURE UNLISTED      breast biopsy- both breast    AL COLONOSCOPY FLX DX W/COLLJ SPEC WHEN PFRMD      patient states done about 5 years ago      Lab Results   Component Value Date/Time    WBC 3.3 (L) 01/10/2018 12:43 PM    HGB 13.3 01/10/2018 12:43 PM    HCT 40.0 01/10/2018 12:43 PM    PLATELET 251 (H)  12:43 PM    MCV 87 01/10/2018 12:43 PM     Lab Results   Component Value Date/Time    Cholesterol, total 261 (H) 2018 12:34 PM    HDL Cholesterol 89 2018 12:34 PM    LDL, calculated 160 (H) 2018 12:34 PM    LDL-C, External 133 2020 11:35 AM    Triglyceride 60 2018 12:34 PM    CHOL/HDL Ratio 3.1 2010 03:14 PM     Lab Results   Component Value Date/Time    GFR est non-AA 67 2018 12:34 PM    GFR est AA 77 2018 12:34 PM Creatinine 0.90 05/09/2018 12:34 PM    BUN 16 05/09/2018 12:34 PM    Sodium 140 05/09/2018 12:34 PM    Potassium 5.3 (H) 05/09/2018 12:34 PM    Chloride 100 05/09/2018 12:34 PM    CO2 26 05/09/2018 12:34 PM        Review of Systems   Respiratory: Negative for shortness of breath. Cardiovascular: Negative for chest pain. Physical Exam  Constitutional:       General: She is not in acute distress. Appearance: Normal appearance. She is not ill-appearing, toxic-appearing or diaphoretic. HENT:      Head: Normocephalic and atraumatic. Right Ear: External ear normal.      Left Ear: External ear normal.   Eyes:      General:         Right eye: No discharge. Left eye: No discharge. Conjunctiva/sclera: Conjunctivae normal.      Pupils: Pupils are equal, round, and reactive to light. Neck:      Vascular: No carotid bruit. Cardiovascular:      Rate and Rhythm: Normal rate and regular rhythm. Heart sounds: No murmur heard. No friction rub. No gallop. Pulmonary:      Effort: No respiratory distress. Breath sounds: Normal breath sounds. No wheezing or rales. Chest:      Chest wall: No tenderness. Musculoskeletal:         General: Normal range of motion. Cervical back: Normal range of motion and neck supple. Skin:     General: Skin is warm and dry. Neurological:      Mental Status: She is alert and oriented to person, place, and time. Mental status is at baseline. Coordination: Coordination normal.      Gait: Gait normal.      Comments: Sensory exam of the foot is normal, tested with the monofilament. Good pulses, no lesions or ulcers, good peripheral pulses. Psychiatric:         Mood and Affect: Mood normal.         Behavior: Behavior normal.         ASSESSMENT and PLAN    ICD-10-CM ICD-9-CM    1.  Controlled type 2 diabetes mellitus without complication, without long-term current use of insulin (HCC)         Controlled on Metformin 2/day continue     E11.9 250.00  DIABETES FOOT EXAM   2. Mixed hyperlipidemia       Intolerant of statins work on weight loss monitor lipids     E78.2 272.2    3. Thyroid disease     Hypothyroid stable on Synthroid and Cytomel E07.9 246.9    4. Obesity (BMI 30.0-34. 9)       Work on Cynvec     E66.9 278.00    5. Hypovitaminosis D       Vitamin D increase to 5000 units/day continue     E55.9 268.9    6. Medicare annual wellness visit, subsequent  Z00.00 V70.0         Depression screen reviewed and negative     Scribed by Janice Mcghee of [address] [place] [address] [address], as dictated by Dr. Maia Gonzalez. Current diagnosis and concerns discussed with pt at length. Pt understands risks and benefits or current treatment plan and medications, and accepts the treatment and medication with any possible risks. Pt asks appropriate questions, which were answered. Pt was instructed to call with any concerns or problems. I have reviewed the note documented by the scribe. The services provided are my own.   The documentation is accurate

## 2021-08-31 ENCOUNTER — OFFICE VISIT (OUTPATIENT)
Dept: INTERNAL MEDICINE CLINIC | Age: 71
End: 2021-08-31
Payer: MEDICARE

## 2021-08-31 VITALS
TEMPERATURE: 97.5 F | DIASTOLIC BLOOD PRESSURE: 63 MMHG | HEART RATE: 67 BPM | BODY MASS INDEX: 33.42 KG/M2 | SYSTOLIC BLOOD PRESSURE: 127 MMHG | HEIGHT: 63 IN | OXYGEN SATURATION: 97 % | RESPIRATION RATE: 16 BRPM | WEIGHT: 188.6 LBS

## 2021-08-31 DIAGNOSIS — Z00.00 MEDICARE ANNUAL WELLNESS VISIT, SUBSEQUENT: ICD-10-CM

## 2021-08-31 DIAGNOSIS — Z78.0 POST-MENOPAUSAL: ICD-10-CM

## 2021-08-31 DIAGNOSIS — E11.9 CONTROLLED TYPE 2 DIABETES MELLITUS WITHOUT COMPLICATION, WITHOUT LONG-TERM CURRENT USE OF INSULIN (HCC): Primary | ICD-10-CM

## 2021-08-31 DIAGNOSIS — E66.9 OBESITY (BMI 30.0-34.9): ICD-10-CM

## 2021-08-31 DIAGNOSIS — E55.9 HYPOVITAMINOSIS D: ICD-10-CM

## 2021-08-31 DIAGNOSIS — E07.9 THYROID DISEASE: ICD-10-CM

## 2021-08-31 DIAGNOSIS — E78.2 MIXED HYPERLIPIDEMIA: ICD-10-CM

## 2021-08-31 LAB
A1C %: 5.9 %
ALBUMIN UR QL STRIP: 10 MG/L
CREATININE, URINE POC: 300 MG/DL
LDL CHOLESTEROL, 804502: 137 MG/DL
MICROALBUMIN/CREAT RATIO POC: <30 MG/G

## 2021-08-31 PROCEDURE — 99213 OFFICE O/P EST LOW 20 MIN: CPT | Performed by: INTERNAL MEDICINE

## 2021-08-31 PROCEDURE — 1101F PT FALLS ASSESS-DOCD LE1/YR: CPT | Performed by: INTERNAL MEDICINE

## 2021-08-31 PROCEDURE — G0439 PPPS, SUBSEQ VISIT: HCPCS | Performed by: INTERNAL MEDICINE

## 2021-08-31 PROCEDURE — G8536 NO DOC ELDER MAL SCRN: HCPCS | Performed by: INTERNAL MEDICINE

## 2021-08-31 PROCEDURE — 3017F COLORECTAL CA SCREEN DOC REV: CPT | Performed by: INTERNAL MEDICINE

## 2021-08-31 PROCEDURE — G9899 SCRN MAM PERF RSLTS DOC: HCPCS | Performed by: INTERNAL MEDICINE

## 2021-08-31 PROCEDURE — G8754 DIAS BP LESS 90: HCPCS | Performed by: INTERNAL MEDICINE

## 2021-08-31 PROCEDURE — G0463 HOSPITAL OUTPT CLINIC VISIT: HCPCS | Performed by: INTERNAL MEDICINE

## 2021-08-31 PROCEDURE — 2022F DILAT RTA XM EVC RTNOPTHY: CPT | Performed by: INTERNAL MEDICINE

## 2021-08-31 PROCEDURE — 82044 UR ALBUMIN SEMIQUANTITATIVE: CPT | Performed by: INTERNAL MEDICINE

## 2021-08-31 PROCEDURE — 3044F HG A1C LEVEL LT 7.0%: CPT | Performed by: INTERNAL MEDICINE

## 2021-08-31 PROCEDURE — G8427 DOCREV CUR MEDS BY ELIG CLIN: HCPCS | Performed by: INTERNAL MEDICINE

## 2021-08-31 PROCEDURE — G8752 SYS BP LESS 140: HCPCS | Performed by: INTERNAL MEDICINE

## 2021-08-31 PROCEDURE — G8510 SCR DEP NEG, NO PLAN REQD: HCPCS | Performed by: INTERNAL MEDICINE

## 2021-08-31 PROCEDURE — G8399 PT W/DXA RESULTS DOCUMENT: HCPCS | Performed by: INTERNAL MEDICINE

## 2021-08-31 PROCEDURE — 1090F PRES/ABSN URINE INCON ASSESS: CPT | Performed by: INTERNAL MEDICINE

## 2021-08-31 PROCEDURE — G8417 CALC BMI ABV UP PARAM F/U: HCPCS | Performed by: INTERNAL MEDICINE

## 2021-08-31 NOTE — PROGRESS NOTES
This is the Subsequent Medicare Annual Wellness Exam, performed 12 months or more after the Initial AWV or the last Subsequent AWV    I have reviewed the patient's medical history in detail and updated the computerized patient record. Assessment/Plan   Education and counseling provided:  Are appropriate based on today's review and evaluation    1. Controlled type 2 diabetes mellitus without complication, without long-term current use of insulin ([address]  2. Mixed hyperlipidemia  3. Thyroid disease  4. Obesity (BMI 30.0-34.9)  5. Hypovitaminosis D  6. Medicare annual wellness visit, subsequent       Depression Risk Factor Screening     3 most recent PHQ Screens 8/31/2021   Little interest or pleasure in doing things Not at all   Feeling down, depressed, irritable, or hopeless Not at all   Total Score PHQ 2 0       Alcohol Risk Screen    Do you average more than 1 drink per night or more than 7 drinks a week:  No    On any one occasion in the past three months have you have had more than 3 drinks containing alcohol:  No        Functional Ability and Level of Safety    Hearing: Hearing is good. Activities of Daily Living: The home contains: no safety equipment. Patient does total self care      Ambulation: with no difficulty     Fall Risk:  Fall Risk Assessment, last 12 mths 8/31/2021   Able to walk? Yes   Fall in past 12 months?  0      Abuse Screen:  Patient is not abused     Lives w   Cognitive Screening    Has your family/caregiver stated any concerns about your memory: no     Cognitive Screening: Normal - Mini Cog Test     No memory concerns   Pt knows the month, day and year   Can recall 3/3 objects     Health Maintenance Due     Health Maintenance Due   Topic Date Due    Shingrix Vaccine Age 49> (1 of 2) Never done    Foot Exam Q1  05/03/2020    MICROALBUMIN Q1  05/03/2020    Medicare Yearly Exam  08/04/2021    A1C test (Diabetic or Prediabetic)  09/04/2021    Lipid Screen  09/04/2021 Patient Care Team   Patient Care Team:  Patrick Weathers MD as PCP - General (Internal Medicine)  Patrick Weathers MD as PCP - [facility]  Daniele Tineo MD (Endocrinology)  Sahil Pelayo MD (Orthopedic Surgery)  Satish Vazquez MD (Gastroenterology)  Anabell Agustin NP (Obstetrics & Gynecology)  Cosme Bryant MD (Ophthalmology)  Charo Mares RD (Optometry)  Chanell Patel MD (Orthopedic Surgery)    History     Patient Active Problem List   Diagnosis Code    Mixed hyperlipidemia E78.2    Thyroid disease E07.9    Headache R51.9    Fibrocystic breast N60.19    Hypovitaminosis D E55.9    Encounter for long-term (current) use of other medications Z79.899    Shingles B02.9    Controlled type 2 diabetes mellitus without complication, without long-term current use of insulin ([facility] [address] E11.9    Obesity (BMI 30.0-34. 9) E66.9     Past Medical History:   Diagnosis Date    Fibrocystic breast     Headache(784.0)     Hypercholesterolemia     Hypovitaminosis D     Shingles     Shingles 3/2011    Thyroid disease       Past Surgical History:   Procedure Laterality Date    BREAST SURGERY PROCEDURE UNLISTED      breast biopsy- both breast    HX  SECTION      [place] COLONOSCOPY FLX DX W/COLLJ SPEC WHEN PFRMD      patient states done about 5 years ago     Current Outpatient Medications   Medication Sig Dispense Refill    metFORMIN ER (GLUCOPHAGE XR) 500 mg tablet Take 1 Tab by mouth daily (with dinner). 90 Tab 1    SYNTHROID 100 mcg tablet TAKE ONE TABLET BY MOUTH DAILY BEFORE BREAKFAST 30 Tab 0    liothyronine (CYTOMEL) 5 mcg tablet TAKE ONE TABLET BY MOUTH DAILY 30 Tab 0    aspirin 81 mg chewable tablet Take 1 Tab by mouth daily. 30 Tab 3    FERROUS FUMARATE/VIT BCOMP&C (SUPER B COMPLEX PO) Take  by mouth every other day.  cholecalciferol, vitamin D3, (VITAMIN D3) 2,000 unit Tab Take 2,000 Int'l Units by mouth Every Mon, Wed & Sun.        Allergies   Allergen Reactions  Aspirin Hives    Nsaids (Non-Steroidal Anti-Inflammatory Drug) Hives    Percocet [Oxycodone-Acetaminophen] Swelling     Wrong medication---patient says it was PERCODAN    Statins-Hmg-Coa Reductase Inhibitors Myalgia       Family History   Problem Relation Age of Onset    Hypertension Mother     Arthritis-rheumatoid Brother      Social History     Tobacco Use    Smoking status: Former Smoker     Packs/day: 0.25     Quit date: 2/15/2010     Years since quittin.5    Smokeless tobacco: Never Used   Substance Use Topics    Alcohol use: Yes     Comment: occasional     ACP not on file. SDM is her .   Provided information      Colonoscopy: Dr. Jessica Jones, 10/04/16, repeat 10 years  Pap: TORRI Owen, 22 every 2 years  Mammogram: [facility],10/28/20 will get report--sister age 52, benign breast bx annual  DEXA: 18, normal due now    AAA: denies fmhx     A1c:   5.9 kapros every 3 months    Eye exam: Elizabeth ORDOÑEZ, , due    Lipids:   per kapros annual  Hep C screen: , negative   EK17, nsr    Covid: both (pfizer)    Declines all immunizations except covid    Medication reconciliation completed by MA and reviewed by me. Medical/surgical/social/family history reviewed and updated by me. Patient provided AVS and preventative screening table. Patient verbalized understanding of all information discussed.       Jorge Ritchie MD

## 2022-01-17 LAB
HBA1C MFR BLD HPLC: 5.8 %
LDL-C, EXTERNAL: 160
TOTAL CHOLESTEROL, NCHOLT: 270

## 2022-02-04 DIAGNOSIS — Z78.0 POST-MENOPAUSAL: ICD-10-CM

## 2022-03-01 NOTE — PROGRESS NOTES
HISTORY OF PRESENT ILLNESS  Adal Jack is a 81 y.o. female. HPI     Last here 21. Pt is here to f/u on chronic conditions. Presents with her  who helps provide hx        BP today is 127/63  ACE Inhibitor or ARB therapy not prescibed for patient reasons--not interested with nl bp .     She is diabetic   BS 80s in general  No longer on ozempic  Continues metformin XR 500mg 2 per day per Dr. Luis Lowe  She also takes ASA 81mg daily      Pt follows with Dr. Nayeli Del Angel (endo) for her thyroid and diabetes  Last visit was    Reviewed 22 labs with Dr. Nayeli Del Angel Cr 1.97   tsh 1.39 a1c 5.8     Now taking 1000U daily of vitamin D     Continues synthroid 100mcg and cytomel 5mg daily for hypothyroidism      Wt today is 188 lbs, stable x lov      Reviewed labs     Pt saw with Dr. Ellie Jauregui (ortho) for shoulder pain previously  Last visit was 3/17  Her sx resolved off crestor   She has not had to f/u with this ortho  Last visit with Dr. Bob zapata 10/19 for ankle pain   No issues currently, has not needed to f/u      Recall pt had myalgias on crestor not interested in starting statin     Continues on vit D    Reviewed DEXA 22: nl     ACP not on file.  SDM is her .   Provided information in the past.      PREVENTIVE:   Colonoscopy: Dr. Yajaira Walker, 10/04/16, repeat 10 years  Pap: TORRI Sweet Page, 22  Mammogram: [place] [facility], 22-will get report  DEXA: 22 normal  AAA: denies fmhx   Foot exam: 21  Microalbumin:   A1c:   5.8 kapros  5.9 kapros  5.8 kapros  Eye exam: Oswaldo ORDOÑEZ,   Lipids:   Kapros   Hep C screen: , negative   EK17, nsr    Covid: three doses (pfizer)  Declines all immunizations except covid    Patient Active Problem List    Diagnosis Date Noted    Obesity (BMI 30.0-34.9) 2018    Controlled type 2 diabetes mellitus without complication, without long-term current use of insulin [facility] 2016    Shingles 2013    Encounter for long-term (current) use of other medications 08/15/2011    Mixed hyperlipidemia 2010    Thyroid disease     Headache     Fibrocystic breast     Hypovitaminosis D      Current Outpatient Medications   Medication Sig Dispense Refill    metFORMIN ER (GLUCOPHAGE XR) 500 mg tablet Take 1 Tab by mouth daily (with dinner). (Patient taking differently: Take 500 mg by mouth two (2) times a day.) 90 Tab 1    SYNTHROID 100 mcg tablet TAKE ONE TABLET BY MOUTH DAILY BEFORE BREAKFAST 30 Tab 0    liothyronine (CYTOMEL) 5 mcg tablet TAKE ONE TABLET BY MOUTH DAILY 30 Tab 0    aspirin 81 mg chewable tablet Take 1 Tab by mouth daily. 30 Tab 3    FERROUS FUMARATE/VIT BCOMP&C (SUPER B COMPLEX PO) Take  by mouth every other day.       cholecalciferol, vitamin D3, (VITAMIN D3) 2,000 unit Tab Take 2,000 Int'l Units by mouth Every Mon, Wed & Sun.       Past Surgical History:   Procedure Laterality Date    HX  SECTION      [place] BREAST SURGERY PROCEDURE UNLISTED      breast biopsy- both breast    [place] COLONOSCOPY FLX DX W/COLLJ SPEC WHEN PFRMD      patient states done about 5 years ago      Lab Results   Component Value Date/Time    WBC 3.3 (L) 01/10/2018 12:43 PM    HGB 13.3 01/10/2018 12:43 PM    HCT 40.0 01/10/2018 12:43 PM    PLATELET 376 (H)  12:43 PM    MCV 87 01/10/2018 12:43 PM     Lab Results   Component Value Date/Time    Cholesterol, total 261 (H) 2018 12:34 PM    HDL Cholesterol 89 2018 12:34 PM    LDL, calculated 160 (H) 2018 12:34 PM    LDL Cholesterol 137 08/10/2021 12:00 AM    LDL-C, External 133 2020 11:35 AM    Triglyceride 60 2018 12:34 PM    CHOL/HDL Ratio 3.1 2010 03:14 PM     Lab Results   Component Value Date/Time    GFR est non-AA 67 2018 12:34 PM    GFR est AA 77 2018 12:34 PM    Creatinine 0.90 2018 12:34 PM    BUN 16 2018 12:34 PM    Sodium 140 2018 12:34 PM    Potassium 5.3 (H) 2018 12:34 PM    Chloride 100 05/09/2018 12:34 PM    CO2 26 05/09/2018 12:34 PM        Review of Systems   Respiratory: Negative for shortness of breath. Cardiovascular: Negative for chest pain. Physical Exam  Constitutional:       General: She is not in acute distress. Appearance: Normal appearance. She is not ill-appearing, toxic-appearing or diaphoretic. HENT:      Head: Normocephalic and atraumatic. Right Ear: External ear normal.      Left Ear: External ear normal.   Eyes:      General:         Right eye: No discharge. Left eye: No discharge. Conjunctiva/sclera: Conjunctivae normal.      Pupils: Pupils are equal, round, and reactive to light. Cardiovascular:      Rate and Rhythm: Normal rate and regular rhythm. Heart sounds: No murmur heard. No friction rub. No gallop. Pulmonary:      Effort: No respiratory distress. Breath sounds: Normal breath sounds. No wheezing or rales. Chest:      Chest wall: No tenderness. Musculoskeletal:         General: Normal range of motion. Cervical back: Normal range of motion and neck supple. Right lower leg: No edema. Left lower leg: No edema. Skin:     General: Skin is warm and dry. Neurological:      Mental Status: She is alert and oriented to person, place, and time. Mental status is at baseline. Coordination: Coordination normal.      Gait: Gait normal.   Psychiatric:         Mood and Affect: Mood normal.         Behavior: Behavior normal.         ASSESSMENT and PLAN    ICD-10-CM ICD-9-CM    1. Mixed hyperlipidemia       Diet controlled LDL has climbed she is not interested in starting statin discussed working on weight loss to improve this   E78.2 272.2    2.  Controlled type 2 diabetes mellitus without complication, without long-term current use of insulin (HCC)       Well-controlled on metformin 500 mg 2/day E11.9 250.00     3 hypothyroidism controlled on Synthroid and Cytomel continue TSH just checked in January at endocrinology clinic    4 vitamin D deficiency improved on 1000 units/day at goal on recent labs    Scribed by Rory Kirk of [facility], as dictated by Dr. Gregg Cuenca. Current diagnosis and concerns discussed with pt at length. Pt understands risks and benefits or current treatment plan and medications, and accepts the treatment and medication with any possible risks. Pt asks appropriate questions, which were answered. Pt was instructed to call with any concerns or problems. I have reviewed the note documented by the scribe. The services provided are my own.   The documentation is accurate

## 2022-03-02 ENCOUNTER — OFFICE VISIT (OUTPATIENT)
Dept: INTERNAL MEDICINE CLINIC | Age: 72
End: 2022-03-02
Payer: MEDICARE

## 2022-03-02 VITALS
SYSTOLIC BLOOD PRESSURE: 119 MMHG | HEART RATE: 64 BPM | BODY MASS INDEX: 33.42 KG/M2 | TEMPERATURE: 98.1 F | DIASTOLIC BLOOD PRESSURE: 78 MMHG | RESPIRATION RATE: 16 BRPM | OXYGEN SATURATION: 98 % | HEIGHT: 63 IN | WEIGHT: 188.6 LBS

## 2022-03-02 DIAGNOSIS — E55.9 HYPOVITAMINOSIS D: ICD-10-CM

## 2022-03-02 DIAGNOSIS — E78.2 MIXED HYPERLIPIDEMIA: ICD-10-CM

## 2022-03-02 DIAGNOSIS — E66.9 OBESITY (BMI 30.0-34.9): ICD-10-CM

## 2022-03-02 DIAGNOSIS — E11.9 CONTROLLED TYPE 2 DIABETES MELLITUS WITHOUT COMPLICATION, WITHOUT LONG-TERM CURRENT USE OF INSULIN (HCC): Primary | ICD-10-CM

## 2022-03-02 DIAGNOSIS — E07.9 THYROID DISEASE: ICD-10-CM

## 2022-03-02 PROCEDURE — 2022F DILAT RTA XM EVC RTNOPTHY: CPT | Performed by: INTERNAL MEDICINE

## 2022-03-02 PROCEDURE — 1090F PRES/ABSN URINE INCON ASSESS: CPT | Performed by: INTERNAL MEDICINE

## 2022-03-02 PROCEDURE — 1101F PT FALLS ASSESS-DOCD LE1/YR: CPT | Performed by: INTERNAL MEDICINE

## 2022-03-02 PROCEDURE — G8417 CALC BMI ABV UP PARAM F/U: HCPCS | Performed by: INTERNAL MEDICINE

## 2022-03-02 PROCEDURE — G8399 PT W/DXA RESULTS DOCUMENT: HCPCS | Performed by: INTERNAL MEDICINE

## 2022-03-02 PROCEDURE — G8427 DOCREV CUR MEDS BY ELIG CLIN: HCPCS | Performed by: INTERNAL MEDICINE

## 2022-03-02 PROCEDURE — 99213 OFFICE O/P EST LOW 20 MIN: CPT | Performed by: INTERNAL MEDICINE

## 2022-03-02 PROCEDURE — 3046F HEMOGLOBIN A1C LEVEL >9.0%: CPT | Performed by: INTERNAL MEDICINE

## 2022-03-02 PROCEDURE — G8536 NO DOC ELDER MAL SCRN: HCPCS | Performed by: INTERNAL MEDICINE

## 2022-03-02 PROCEDURE — 3017F COLORECTAL CA SCREEN DOC REV: CPT | Performed by: INTERNAL MEDICINE

## 2022-03-02 PROCEDURE — G0463 HOSPITAL OUTPT CLINIC VISIT: HCPCS | Performed by: INTERNAL MEDICINE

## 2022-03-02 PROCEDURE — G8510 SCR DEP NEG, NO PLAN REQD: HCPCS | Performed by: INTERNAL MEDICINE

## 2022-03-02 PROCEDURE — G9899 SCRN MAM PERF RSLTS DOC: HCPCS | Performed by: INTERNAL MEDICINE

## 2022-03-19 PROBLEM — E66.9 OBESITY (BMI 30.0-34.9): Status: ACTIVE | Noted: 2018-05-09

## 2022-03-19 PROBLEM — E66.811 OBESITY (BMI 30.0-34.9): Status: ACTIVE | Noted: 2018-05-09

## 2022-07-17 LAB
CREATININE, EXTERNAL: 0.77
HBA1C MFR BLD HPLC: 6 %
LDL-C, EXTERNAL: 131

## 2022-08-31 NOTE — PROGRESS NOTES
HISTORY OF PRESENT ILLNESS  Bushra Valladares is a 81 y.o. female. HPI  Last here 3/2/22. Pt is here to f/u on chronic conditions. Presents with her  who helps provide hx      BP today is 126/83  ACE Inhibitor or ARB therapy not prescibed for patient reasons--not interested with nl bp . She is diabetic   BS <= 100, usually 90s  Continues metformin XR 500mg 2 per day per Dr. Jovani Romero   No longer on ozempic  She also takes ASA 81mg daily      Pt follows with Dr. Jovani Romero (endo) for her thyroid and diabetes  Last visit was 7/22, will f/U 10/22      Wt today is 190 lbs, up 2 lbs since lov  Discussed diet and wt/l      Reviewed labs  Ordered labs  Kapros labs missing microalbumin, will run labs today     Pt saw with Dr. Mamadou Gauthier (ortho) for shoulder pain previously  Last visit was 3/17  Her sx resolved off crestor   She has not had to f/u with this ortho    Last visit with Dr. Rajendra zapata 10/19 for ankle pain   No issues currently, has not needed to f/u      Recall pt had myalgias on crestor not interested in starting statin  Discussed elevated LDL on last labs, discussed Zetia, she is open to this  Ordered Zetia 10 mg     Continues on 2000U daily of vitamin D (increased from 1000U lov)     Continues synthroid 100mcg and cytomel 5mg daily for hypothyroidism    Pt lives w/ her   No safety equipment     Pt is functionally independent   Does own laundry  Does own driving  Does own bills and meds       No memory concerns   Knows the month, date, year, location   Can recall 3/3 objects      ACP not on file. SDM is her , Estrada Single.   Provided information in the past.      PREVENTIVE:   Colonoscopy: Dr. oJse Castro, 10/04/16, repeat 10 years  Pap: NP Renea Owen, 2/03/22  Mammogram: [facility], 2/03/22, nl  DEXA: 1/24/22 normal  AAA: denies fmhx   Foot exam: 9/7/22  Microalbumin: 8/21 , poc 9/22  A1c:  1/21 5.8 kapros 8/21 5.9 kapros 1/22 5.8 kapros 7/22 6.0 Kapros  Eye exam: Dr. Jyoti Jeff at [facility],   Lipids:   Kapros   Hep C screen: , negative   EK17, nsr    Covid: three doses (pfizer)  Declines all immunizations except covid      Patient Active Problem List    Diagnosis Date Noted    Obesity (BMI 30.0-34.9) 2018    Controlled type 2 diabetes mellitus without complication, without long-term current use of insulin ([address] 2016    Shingles 2013    Encounter for long-term (current) use of other medications 08/15/2011    Mixed hyperlipidemia 2010    Thyroid disease     Headache     Fibrocystic breast     Hypovitaminosis D      Current Outpatient Medications   Medication Sig Dispense Refill    metFORMIN ER (GLUCOPHAGE XR) 500 mg tablet Take 1 Tab by mouth daily (with dinner). (Patient taking differently: Take 500 mg by mouth two (2) times a day.) 90 Tab 1    SYNTHROID 100 mcg tablet TAKE ONE TABLET BY MOUTH DAILY BEFORE BREAKFAST 30 Tab 0    liothyronine (CYTOMEL) 5 mcg tablet TAKE ONE TABLET BY MOUTH DAILY 30 Tab 0    aspirin 81 mg chewable tablet Take 1 Tab by mouth daily. 30 Tab 3    FERROUS FUMARATE/VIT BCOMP&C (SUPER B COMPLEX PO) Take  by mouth every other day.       cholecalciferol, vitamin D3, (VITAMIN D3) 2,000 unit Tab Take 2,000 Int'l Units by mouth Every Mon, Wed & Sun.       Past Surgical History:   Procedure Laterality Date    HX  SECTION      ND BREAST SURGERY PROCEDURE UNLISTED      breast biopsy- both breast    ND COLONOSCOPY FLX DX W/COLLJ SPEC WHEN PFRMD      patient states done about 5 years ago      Lab Results   Component Value Date/Time    WBC 3.3 (L) 01/10/2018 12:43 PM    HGB 13.3 01/10/2018 12:43 PM    HCT 40.0 01/10/2018 12:43 PM    PLATELET 420 (H)  12:43 PM    MCV 87 01/10/2018 12:43 PM     Lab Results   Component Value Date/Time    Cholesterol, total 261 (H) 2018 12:34 PM    HDL Cholesterol 89 2018 12:34 PM    LDL Cholesterol 137 08/10/2021 12:00 AM    LDL, calculated 160 (H) 2018 12:34 PM    LDL-C, External 160 01/17/2022 12:00 AM    Triglyceride 60 05/09/2018 12:34 PM    CHOL/HDL Ratio 3.1 01/20/2010 03:14 PM     Lab Results   Component Value Date/Time    GFR est non-AA 67 05/09/2018 12:34 PM    GFR est AA 77 05/09/2018 12:34 PM    Creatinine 0.90 05/09/2018 12:34 PM    BUN 16 05/09/2018 12:34 PM    Sodium 140 05/09/2018 12:34 PM    Potassium 5.3 (H) 05/09/2018 12:34 PM    Chloride 100 05/09/2018 12:34 PM    CO2 26 05/09/2018 12:34 PM        Review of Systems   Respiratory:  Negative for shortness of breath. Cardiovascular:  Negative for chest pain. Physical Exam  Constitutional:       General: She is not in acute distress. Appearance: She is not ill-appearing, toxic-appearing or diaphoretic. HENT:      Head: Normocephalic and atraumatic. Right Ear: External ear normal.      Left Ear: External ear normal.   Eyes:      General:         Right eye: No discharge. Left eye: No discharge. Conjunctiva/sclera: Conjunctivae normal.      Pupils: Pupils are equal, round, and reactive to light. Cardiovascular:      Rate and Rhythm: Normal rate and regular rhythm. Heart sounds: No murmur heard. No friction rub. No gallop. Pulmonary:      Effort: No respiratory distress. Breath sounds: Normal breath sounds. No wheezing or rales. Chest:      Chest wall: No tenderness. Musculoskeletal:         General: Normal range of motion. Cervical back: Normal.   Feet:      Comments: Sensory exam of the foot is normal, tested with the monofilament. Good pulses, no lesions or ulcers, good peripheral pulses. Skin:     General: Skin is warm and dry. Neurological:      Mental Status: She is oriented to person, place, and time. Mental status is at baseline. Coordination: Coordination normal.      Gait: Gait normal.   Psychiatric:         Mood and Affect: Mood normal.         Behavior: Behavior normal.       ASSESSMENT and PLAN    ICD-10-CM ICD-9-CM    1.  Controlled type 2 diabetes mellitus without complication, without long-term current use of insulin (HCC)  E11.9 250.00  DIABETES FOOT EXAM   Well-controlled on metformin Home readings good A1c just completed from July reviewed getting microalbumin here today   2. Medicare annual wellness visit, subsequent  Z00.00 V70.0       3. Mixed hyperlipidemia  E78.2 272.2    Has been intolerant of statins discussed this her LDLs have been moderately elevated and given that she is a diabetic she would benefit from lipid-lowering therapy    Will give Zetia 10 mg daily try she will get lipids checked in 2 months at endocrinology   4. Fibrocystic breast disease (FCBD), unspecified laterality  N60.19 610.1    Mammogram up-to-date reviewed   5. Obesity (BMI 30.0-34. 9)  E66.9 278.00    Discussed working on portions metformin to assist previously was on Ozempic   6. Hypovitaminosis D  E55.9 268.9    Continue over-the-counter vitamin D 2000 units/day at goal   Hypothyroid--controlled on Synthroid and Cytomel on recent labs continue no change to dose  Depression screen reviewed and negative. Scribed by Samuel Santiago, as dictated by Dr. Nery Aguilar. Current diagnosis and concerns discussed with pt at length. Pt understands risks and benefits or current treatment plan and medications, and accepts the treatment and medication with any possible risks. Pt asks appropriate questions, which were answered. Pt was instructed to call with any concerns or problems. I have reviewed the note documented by the scribe. The services provided are my own. The documentation is accurate.

## 2022-09-06 NOTE — PROGRESS NOTES
This is the Subsequent Medicare Annual Wellness Exam, performed 12 months or more after the Initial AWV or the last Subsequent AWV    I have reviewed the patient's medical history in detail and updated the computerized patient record. Assessment/Plan   Education and counseling provided:  Are appropriate based on today's review and evaluation    1. Medicare annual wellness visit, subsequent     Depression Risk Factor Screening     3 most recent PHQ Screens 9/7/2022   Little interest or pleasure in doing things Not at all   Feeling down, depressed, irritable, or hopeless Not at all   Total Score PHQ 2 0       Alcohol & Drug Abuse Risk Screen    Do you average more than 1 drink per night or more than 7 drinks a week:  No    On any one occasion in the past three months have you have had more than 3 drinks containing alcohol:  No          Functional Ability and Level of Safety    Hearing: Hearing is good. Activities of Daily Living: The home contains: handrails and grab bars  Patient does total self care      Ambulation: with no difficulty     Fall Risk:  Fall Risk Assessment, last 12 mths 9/7/2022   Able to walk? Yes   Fall in past 12 months?  0      Abuse Screen:  Patient is not abused       Cognitive Screening    Has your family/caregiver stated any concerns about your memory: no     Cognitive Screening: Normal - Mini Cog Test    No memory concerns   Pt knows the month, day and year   Can recall 3/3 objects      Health Maintenance Due     Health Maintenance Due   Topic Date Due    Shingrix Vaccine Age 49> (1 of 2) Never done    Eye Exam Retinal or Dilated  07/20/2022    Foot Exam Q1  08/31/2022    MICROALBUMIN Q1  08/31/2022    Flu Vaccine (1) Never done    Medicare Yearly Exam  09/01/2022       Patient Care Team   Patient Care Team:  Vern Elam MD as PCP - General (Internal Medicine Physician)  Vern Elam MD as PCP - [facility] Empaneled Provider  Ayaka Martines MD (Endocrinology Physician)  Terry Thomas MD (Orthopedic Surgery)  Rory Sorenson MD (Inactive) (Gastroenterology)  Nathan Méndez NP (Obstetrics & Gynecology)  Benito Knox MD (Ophthalmology)  Julia Vega, [address] [place] [address] (Optometry)  Payal Campbell MD (Orthopedic Surgery)    History     Patient Active Problem List   Diagnosis Code    Mixed hyperlipidemia E78.2    Thyroid disease E07.9    Headache R51.9    Fibrocystic breast N60.19    Hypovitaminosis D E55.9    Encounter for long-term (current) use of other medications Z79.899    Shingles B02.9    Controlled type 2 diabetes mellitus without complication, without long-term current use of insulin ([address] E11.9    Obesity (BMI 30.0-34. 9) E66.9     Past Medical History:   Diagnosis Date    Fibrocystic breast     Headache(784.0)     Hypercholesterolemia     Hypovitaminosis D     Shingles     Shingles 3/2011    Thyroid disease       Past Surgical History:   Procedure Laterality Date    HX  SECTION      ME BREAST SURGERY PROCEDURE UNLISTED      breast biopsy- both breast    ME COLONOSCOPY FLX DX W/COLLJ SPEC WHEN PFRMD      patient states done about 5 years ago     Current Outpatient Medications   Medication Sig Dispense Refill    metFORMIN ER (GLUCOPHAGE XR) 500 mg tablet Take 1 Tab by mouth daily (with dinner). (Patient taking differently: Take 500 mg by mouth two (2) times a day.) 90 Tab 1    SYNTHROID 100 mcg tablet TAKE ONE TABLET BY MOUTH DAILY BEFORE BREAKFAST 30 Tab 0    liothyronine (CYTOMEL) 5 mcg tablet TAKE ONE TABLET BY MOUTH DAILY 30 Tab 0    aspirin 81 mg chewable tablet Take 1 Tab by mouth daily. 30 Tab 3    FERROUS FUMARATE/VIT BCOMP&C (SUPER B COMPLEX PO) Take  by mouth every other day. cholecalciferol, vitamin D3, (VITAMIN D3) 2,000 unit Tab Take 2,000 Int'l Units by mouth Every Mon, Wed & Sun.        Allergies   Allergen Reactions    Aspirin Hives    Nsaids (Non-Steroidal Anti-Inflammatory Drug) Hives    Percocet [Oxycodone-Acetaminophen] Swelling     Wrong medication---patient says it was PERCODAN    Statins-Hmg-Coa Reductase Inhibitors Myalgia       Family History   Problem Relation Age of Onset    Hypertension Mother     Arthritis-rheumatoid Brother      Social History     Tobacco Use    Smoking status: Former     Packs/day: 0.25     Types: Cigarettes     Quit date: 2/15/2010     Years since quittin.5    Smokeless tobacco: Never   Substance Use Topics    Alcohol use: Yes     Comment: occasional     ACP not on file. SDM is her . Provided information in the past.        Colonoscopy: Dr. Ryland Boyd, 10/04/16, repeat 10 years  Pap: TORRI Owen, 22  Mammogram: [facility], 22-annual   DEXA: 24 normal q 2 years  AAA: denies fmhx     A1c:    6  kapros    Eye exam: Dr. Cara Pappas at [facility],  annual     Lipids:    Kapros annual   Hep C screen: , negative   EK17, nsr      Covid: three doses (pfizer)  Declines all immunizations except covid    Medication reconciliation completed by MA and reviewed by me. Medical/surgical/social/family history reviewed and updated by me. Patient provided AVS and preventative screening table. Patient verbalized understanding of all information discussed.      Pradepe Castro MD

## 2022-09-06 NOTE — PATIENT INSTRUCTIONS
Medicare Wellness Visit, Female     The best way to live healthy is to have a lifestyle where you eat a well-balanced diet, exercise regularly, limit alcohol use, and quit all forms of tobacco/nicotine, if applicable. Regular preventive services are another way to keep healthy. Preventive services (vaccines, screening tests, monitoring & exams) can help personalize your care plan, which helps you manage your own care. Screening tests can find health problems at the earliest stages, when they are easiest to treat. Pam follows the current, evidence-based guidelines published by the [facility] ([facility]) when recommending preventive services for our patients. Because we follow these guidelines, sometimes recommendations change over time as research supports it. (For example, mammograms used to be recommended annually. Even though Medicare will still pay for an annual mammogram, the newer guidelines recommend a mammogram every two years for women of average risk). Of course, you and your doctor may decide to screen more often for some diseases, based on your risk and your co-morbidities (chronic disease you are already diagnosed with). Preventive services for you include:  - Medicare offers their members a free annual wellness visit, which is time for you and your primary care provider to discuss and plan for your preventive service needs. Take advantage of this benefit every year!  -All adults over the age of 72 should receive the recommended pneumonia vaccines. Current USPSTF guidelines recommend a series of two vaccines for the best pneumonia protection.   -All adults should have a flu vaccine yearly and a tetanus vaccine every 10 years.   -All adults age 48 and older should receive the shingles vaccines (series of two vaccines).       -All adults age 38-68 who are overweight should have a diabetes screening test once every three years.   -All adults born between [birth year] and 1963 should be screened once for Hepatitis C.  -Other screening tests and preventive services for persons with diabetes include: an eye exam to screen for diabetic retinopathy, a kidney function test, a foot exam, and stricter control over your cholesterol.   -Cardiovascular screening for adults with routine risk involves an electrocardiogram (ECG) at intervals determined by your doctor.   -Colorectal cancer screenings should be done for adults age 54-65 with no increased risk factors for colorectal cancer. There are a number of acceptable methods of screening for this type of cancer. Each test has its own benefits and drawbacks. Discuss with your doctor what is most appropriate for you during your annual wellness visit. The different tests include: colonoscopy (considered the best screening method), a fecal occult blood test, a fecal DNA test, and sigmoidoscopy.    -A bone mass density test is recommended when a woman turns 65 to screen for osteoporosis. This test is only recommended one time, as a screening. Some providers will use this same test as a disease monitoring tool if you already have osteoporosis. -Breast cancer screenings are recommended every other year for women of normal risk, age 54-69.  -Cervical cancer screenings for women over age 72 are only recommended with certain risk factors.      Here is a list of your current Health Maintenance items (your personalized list of preventive services) with a due date:  Health Maintenance Due   Topic Date Due    Shingles Vaccine (1 of 2) Never done    Eye Exam  07/20/2022    Diabetic Foot Care  08/31/2022    Albumin Urine Test  08/31/2022    Yearly Flu Vaccine (1) Never done    Annual Well Visit  09/01/2022

## 2022-09-07 ENCOUNTER — OFFICE VISIT (OUTPATIENT)
Dept: INTERNAL MEDICINE CLINIC | Age: 72
End: 2022-09-07
Payer: MEDICARE

## 2022-09-07 VITALS
BODY MASS INDEX: 33.81 KG/M2 | HEIGHT: 63 IN | SYSTOLIC BLOOD PRESSURE: 126 MMHG | RESPIRATION RATE: 16 BRPM | WEIGHT: 190.8 LBS | TEMPERATURE: 98.6 F | DIASTOLIC BLOOD PRESSURE: 83 MMHG | OXYGEN SATURATION: 98 % | HEART RATE: 76 BPM

## 2022-09-07 DIAGNOSIS — N60.19 FIBROCYSTIC BREAST DISEASE (FCBD), UNSPECIFIED LATERALITY: ICD-10-CM

## 2022-09-07 DIAGNOSIS — E07.9 THYROID DISEASE: ICD-10-CM

## 2022-09-07 DIAGNOSIS — E11.9 CONTROLLED TYPE 2 DIABETES MELLITUS WITHOUT COMPLICATION, WITHOUT LONG-TERM CURRENT USE OF INSULIN (HCC): Primary | ICD-10-CM

## 2022-09-07 DIAGNOSIS — E55.9 HYPOVITAMINOSIS D: ICD-10-CM

## 2022-09-07 DIAGNOSIS — Z00.00 MEDICARE ANNUAL WELLNESS VISIT, SUBSEQUENT: ICD-10-CM

## 2022-09-07 DIAGNOSIS — E78.2 MIXED HYPERLIPIDEMIA: ICD-10-CM

## 2022-09-07 DIAGNOSIS — E66.9 OBESITY (BMI 30.0-34.9): ICD-10-CM

## 2022-09-07 LAB
ALBUMIN UR QL STRIP: 10 MG/L
CREATININE, URINE POC: 50 MG/DL
MICROALBUMIN/CREAT RATIO POC: <30 MG/G

## 2022-09-07 PROCEDURE — 3044F HG A1C LEVEL LT 7.0%: CPT | Performed by: INTERNAL MEDICINE

## 2022-09-07 PROCEDURE — 99213 OFFICE O/P EST LOW 20 MIN: CPT | Performed by: INTERNAL MEDICINE

## 2022-09-07 PROCEDURE — G9899 SCRN MAM PERF RSLTS DOC: HCPCS | Performed by: INTERNAL MEDICINE

## 2022-09-07 PROCEDURE — G8427 DOCREV CUR MEDS BY ELIG CLIN: HCPCS | Performed by: INTERNAL MEDICINE

## 2022-09-07 PROCEDURE — 82044 UR ALBUMIN SEMIQUANTITATIVE: CPT | Performed by: INTERNAL MEDICINE

## 2022-09-07 PROCEDURE — G8536 NO DOC ELDER MAL SCRN: HCPCS | Performed by: INTERNAL MEDICINE

## 2022-09-07 PROCEDURE — G0439 PPPS, SUBSEQ VISIT: HCPCS | Performed by: INTERNAL MEDICINE

## 2022-09-07 PROCEDURE — G8399 PT W/DXA RESULTS DOCUMENT: HCPCS | Performed by: INTERNAL MEDICINE

## 2022-09-07 PROCEDURE — G8417 CALC BMI ABV UP PARAM F/U: HCPCS | Performed by: INTERNAL MEDICINE

## 2022-09-07 PROCEDURE — 1101F PT FALLS ASSESS-DOCD LE1/YR: CPT | Performed by: INTERNAL MEDICINE

## 2022-09-07 PROCEDURE — 3017F COLORECTAL CA SCREEN DOC REV: CPT | Performed by: INTERNAL MEDICINE

## 2022-09-07 PROCEDURE — 2022F DILAT RTA XM EVC RTNOPTHY: CPT | Performed by: INTERNAL MEDICINE

## 2022-09-07 PROCEDURE — 1090F PRES/ABSN URINE INCON ASSESS: CPT | Performed by: INTERNAL MEDICINE

## 2022-09-07 PROCEDURE — G0463 HOSPITAL OUTPT CLINIC VISIT: HCPCS | Performed by: INTERNAL MEDICINE

## 2022-09-07 PROCEDURE — G8510 SCR DEP NEG, NO PLAN REQD: HCPCS | Performed by: INTERNAL MEDICINE

## 2022-09-07 RX ORDER — EZETIMIBE 10 MG/1
10 TABLET ORAL DAILY
Qty: 90 TABLET | Refills: 1 | Status: SHIPPED | OUTPATIENT
Start: 2022-09-07

## 2022-09-07 NOTE — PROGRESS NOTES
1. \"Have you been to the ER, urgent care clinic since your last visit? Hospitalized since your last visit? \" No    2. \"Have you seen or consulted any other health care providers outside of the [address] [place] [address] since your last visit? \" No     3. For patients aged 39-70: Has the patient had a colonoscopy / FIT/ Cologuard? Yes - no Care Gap present      If the patient is female:    4. For patients aged 41-77: Has the patient had a mammogram within the past 2 years? Yes - no Care Gap present      5. For patients aged 21-65: Has the patient had a pap smear?  Yes - no Care Gap present

## 2023-02-14 LAB — HBA1C MFR BLD HPLC: 6.1 %

## 2023-02-15 LAB
HBA1C MFR BLD HPLC: 6.1 %
LDL-C, EXTERNAL: 119

## 2023-02-28 ENCOUNTER — TRANSCRIBE ORDER (OUTPATIENT)
Dept: SCHEDULING | Age: 73
End: 2023-02-28

## 2023-02-28 DIAGNOSIS — M66.871 NONTRAUMATIC TEAR OF TIBIALIS POSTERIOR TENDON, RIGHT: Primary | ICD-10-CM

## 2023-03-06 NOTE — PROGRESS NOTES
HISTORY OF PRESENT ILLNESS  Christine Pelayo is a 72 y.o. female. HPI  Last here 9/7/22. Pt is here to f/u on chronic conditions. Presents with her  who helps provide hx    States she had a dental procedure and a bone graft on 1/23/23. Was given amoxicillin, but she has completed this       BP today is 137/78  ACE Inhibitor or ARB therapy not prescibed for patient reasons--not interested with nl bp . She is diabetic   BS 80s, however was higher while she was on amoxicillin   Continues metformin XR 500mg 2 per day per Dr. Alanis Duffy   No longer on ozempic  She also takes ASA 81mg daily      Pt follows with Dr. Alanis Duffy (endo) for her thyroid and diabetes  Last visit was 2/23   Reviewed labs from Dr. Alanis Duffy   Now taking synthroid 88 mcg (decreased from 100 mcg) as last TSH was suppressed reviewed labs   and cytomel 5mg daily for hypothyroidism      Wt today is 189 lbs, stable since lov   Discussed diet and wt/l      Reviewed labs  Ordered labs     Pt saw with Dr. Neida Elizabeth (ortho) for shoulder pain previously  Last visit was 3/17  Her sx resolved off crestor   She has not had to f/u with this ortho     She previously saw Dr. Jairo Uribe (ortho) 10/19 for ankle pain may go back if her foot is not improving    She more recently saw Dr. Elodie Soulier (podiatry) for her R foot    She has had some injection she has been having a lot of pain on the bottom of her foot concern for some planter fasciitis but she also has pain on the top of the foot and has been having some swelling in that foot as well  Has an MRI foot scheduled 3/9/23  She was taking lasix 20 mg half tablet for R leg swelling from her foot ultimately this is not really helped so she stopped taking it  However she stopped as she was concerned about taking NSAID's with this. Discussed this is fine to hold off on this, advised compression hose.  Discussed going back to Dr. Abraham Charles could be the next option if sx's do not improve      Recall pt had myalgias on crestor not interested in starting statin  Lov I ordered Zetia 10 mg, however this gave her side effects  so currently just working on diet for now not interested in medication     Continues on 2000U daily of vitamin D      Pt lives w/ her      ACP not on file. SDM is her , Vinicius Domínguez. Provided information in the past.      PREVENTIVE:   Colonoscopy: Dr. Rockwell Adjutant, 10/04/16, repeat 10 years  Pap: NP Renea Owen, 22   Mammogram: [facility], 22, nl, due reminded   DEXA: 22 normal  AAA: denies fmhx   Foot exam: 22  Microalbumin:  poc   A1c:  Gearld Fan  6.1  Kapros 6.1   Eye exam: [facility]. Jennifer Child at [facility], , due reminded   Lipids: 3/16/23 Kapros   Hep C screen: , negative   EK17, nsr    Covid: 4 doses (Danae Song), including bivalent   Declines all immunizations except covid    Patient Active Problem List    Diagnosis Date Noted    Obesity (BMI 30.0-34.9) 2018    Controlled type 2 diabetes mellitus without complication, without long-term current use of insulin ([facility] [address] 2016    Shingles 2013    Mixed hyperlipidemia 2010    Thyroid disease     Headache     Fibrocystic breast     Hypovitaminosis D      Current Outpatient Medications   Medication Sig Dispense Refill    ezetimibe (ZETIA) 10 mg tablet Take 1 Tablet by mouth daily. 90 Tablet 1    metFORMIN ER (GLUCOPHAGE XR) 500 mg tablet Take 1 Tab by mouth daily (with dinner). (Patient taking differently: Take 500 mg by mouth two (2) times a day.) 90 Tab 1    SYNTHROID 100 mcg tablet TAKE ONE TABLET BY MOUTH DAILY BEFORE BREAKFAST 30 Tab 0    liothyronine (CYTOMEL) 5 mcg tablet TAKE ONE TABLET BY MOUTH DAILY 30 Tab 0    aspirin 81 mg chewable tablet Take 1 Tab by mouth daily. 30 Tab 3    FERROUS FUMARATE/VIT BCOMP&C (SUPER B COMPLEX PO) Take  by mouth every other day.       cholecalciferol, vitamin D3, 50 mcg (2,000 unit) tab Take 2,000 Int'l Units by mouth Every Mon, Wed & Sun.       Past Surgical History:   Procedure Laterality Date    HX  SECTION      ND BREAST SURGERY PROCEDURE UNLISTED      breast biopsy- both breast    ND COLONOSCOPY FLX DX W/COLLJ SPEC WHEN PFRMD      patient states done about 5 years ago      Lab Results   Component Value Date/Time    WBC 3.3 (L) 01/10/2018 12:43 PM    HGB 13.3 01/10/2018 12:43 PM    HCT 40.0 01/10/2018 12:43 PM    PLATELET 445 (H)  12:43 PM    MCV 87 01/10/2018 12:43 PM     Lab Results   Component Value Date/Time    Cholesterol, total 261 (H) 2018 12:34 PM    HDL Cholesterol 89 2018 12:34 PM    LDL Cholesterol 137 08/10/2021 12:00 AM    LDL, calculated 160 (H) 2018 12:34 PM    LDL-C, External 160 2022 12:00 AM    Triglyceride 60 2018 12:34 PM    CHOL/HDL Ratio 3.1 2010 03:14 PM     Lab Results   Component Value Date/Time    GFR est non-AA 67 2018 12:34 PM    GFR est AA 77 2018 12:34 PM    Creatinine 0.90 2018 12:34 PM    BUN 16 2018 12:34 PM    Sodium 140 2018 12:34 PM    Potassium 5.3 (H) 2018 12:34 PM    Chloride 100 2018 12:34 PM    CO2 26 2018 12:34 PM        Review of Systems   Respiratory:  Negative for shortness of breath. Cardiovascular:  Negative for chest pain. Physical Exam  Constitutional:       General: She is not in acute distress. Appearance: She is not ill-appearing, toxic-appearing or diaphoretic. HENT:      Head: Normocephalic and atraumatic. Right Ear: External ear normal.      Left Ear: External ear normal.   Eyes:      General:         Right eye: No discharge. Left eye: No discharge. Conjunctiva/sclera: Conjunctivae normal.      Pupils: Pupils are equal, round, and reactive to light. Cardiovascular:      Rate and Rhythm: Normal rate and regular rhythm. Heart sounds: No murmur heard. No friction rub. No gallop. Pulmonary:      Effort: No respiratory distress. Breath sounds: Normal breath sounds.  No wheezing or rales. Chest:      Chest wall: No tenderness. Musculoskeletal:         General: Normal range of motion. Cervical back: Normal.      Right lower leg: Edema (non-pitting) present. Skin:     General: Skin is warm and dry. Neurological:      Mental Status: She is oriented to person, place, and time. Mental status is at baseline. Coordination: Coordination normal.      Gait: Gait normal.   Psychiatric:         Mood and Affect: Mood normal.         Behavior: Behavior normal.       ASSESSMENT and PLAN    ICD-10-CM ICD-9-CM    1. Controlled type 2 diabetes mellitus without complication, without long-term current use of insulin (HCC)  E11.9 250.00    Stable on metformin 500 mg 2 of these per day   2. Mixed hyperlipidemia  E78.2 272.2    Declines medication working on diet intolerant of statins and Zetia   3. Obesity (BMI 30.0-34. 9)  E66.9 278.00    Work on portions   4. Acquired hypothyroidism  E03.9 244.9    TSH suppressed on February labs Synthroid has since been decreased to 88 mcg    Continues on Cytomel 5 mg    TSH will be repeated endocrinology clinic in 2 months   5. Foot pain, right  M79.671 729.5    Possible plantar fasciitis seeing podiatry has imaging with MRI pending    Discussed remaining off Lasix no significant edema on exam I do not think the Lasix would be very beneficial at this point did discuss leg elevation and compression hose   6. Encounter for screening mammogram for malignant neoplasm of breast  Z12.31 V76.12 [facility] MAMMO BI SCREENING INCL CAD        Depression screen reviewed and negative. Scribed by Kirk Fisher, as dictated by Dr. Gregory Ott. Current diagnosis and concerns discussed with pt at length. Pt understands risks and benefits or current treatment plan and medications, and accepts the treatment and medication with any possible risks. Pt asks appropriate questions, which were answered.  Pt was instructed to call with any concerns or problems. I have reviewed the note documented by the scribe. The services provided are my own. The documentation is accurate.

## 2023-03-07 ENCOUNTER — OFFICE VISIT (OUTPATIENT)
Dept: INTERNAL MEDICINE CLINIC | Age: 73
End: 2023-03-07
Payer: MEDICARE

## 2023-03-07 VITALS
HEART RATE: 81 BPM | RESPIRATION RATE: 16 BRPM | TEMPERATURE: 98.1 F | BODY MASS INDEX: 33.49 KG/M2 | DIASTOLIC BLOOD PRESSURE: 78 MMHG | WEIGHT: 189 LBS | SYSTOLIC BLOOD PRESSURE: 137 MMHG | OXYGEN SATURATION: 98 % | HEIGHT: 63 IN

## 2023-03-07 DIAGNOSIS — E11.9 CONTROLLED TYPE 2 DIABETES MELLITUS WITHOUT COMPLICATION, WITHOUT LONG-TERM CURRENT USE OF INSULIN (HCC): Primary | ICD-10-CM

## 2023-03-07 DIAGNOSIS — M79.671 FOOT PAIN, RIGHT: ICD-10-CM

## 2023-03-07 DIAGNOSIS — E66.9 OBESITY (BMI 30.0-34.9): ICD-10-CM

## 2023-03-07 DIAGNOSIS — Z12.31 ENCOUNTER FOR SCREENING MAMMOGRAM FOR MALIGNANT NEOPLASM OF BREAST: ICD-10-CM

## 2023-03-07 DIAGNOSIS — E03.9 ACQUIRED HYPOTHYROIDISM: ICD-10-CM

## 2023-03-07 DIAGNOSIS — E78.2 MIXED HYPERLIPIDEMIA: ICD-10-CM

## 2023-03-07 PROCEDURE — 1090F PRES/ABSN URINE INCON ASSESS: CPT | Performed by: INTERNAL MEDICINE

## 2023-03-07 PROCEDURE — G8510 SCR DEP NEG, NO PLAN REQD: HCPCS | Performed by: INTERNAL MEDICINE

## 2023-03-07 PROCEDURE — G8427 DOCREV CUR MEDS BY ELIG CLIN: HCPCS | Performed by: INTERNAL MEDICINE

## 2023-03-07 PROCEDURE — 99213 OFFICE O/P EST LOW 20 MIN: CPT | Performed by: INTERNAL MEDICINE

## 2023-03-07 PROCEDURE — G8417 CALC BMI ABV UP PARAM F/U: HCPCS | Performed by: INTERNAL MEDICINE

## 2023-03-07 PROCEDURE — G0463 HOSPITAL OUTPT CLINIC VISIT: HCPCS | Performed by: INTERNAL MEDICINE

## 2023-03-07 PROCEDURE — 3044F HG A1C LEVEL LT 7.0%: CPT | Performed by: INTERNAL MEDICINE

## 2023-03-07 PROCEDURE — G8399 PT W/DXA RESULTS DOCUMENT: HCPCS | Performed by: INTERNAL MEDICINE

## 2023-03-07 PROCEDURE — 3017F COLORECTAL CA SCREEN DOC REV: CPT | Performed by: INTERNAL MEDICINE

## 2023-03-07 PROCEDURE — 1101F PT FALLS ASSESS-DOCD LE1/YR: CPT | Performed by: INTERNAL MEDICINE

## 2023-03-07 PROCEDURE — G9899 SCRN MAM PERF RSLTS DOC: HCPCS | Performed by: INTERNAL MEDICINE

## 2023-03-07 PROCEDURE — 2022F DILAT RTA XM EVC RTNOPTHY: CPT | Performed by: INTERNAL MEDICINE

## 2023-03-07 PROCEDURE — G8536 NO DOC ELDER MAL SCRN: HCPCS | Performed by: INTERNAL MEDICINE

## 2023-03-07 RX ORDER — FUROSEMIDE 20 MG/1
TABLET ORAL
COMMUNITY
Start: 2023-02-23

## 2023-03-07 NOTE — PROGRESS NOTES
1. \"Have you been to the ER, urgent care clinic since your last visit? Hospitalized since your last visit? \" No    2. \"Have you seen or consulted any other health care providers outside of the [address] [place] [address] since your last visit? \" No     3. For patients aged 39-70: Has the patient had a colonoscopy / FIT/ Cologuard? Yes - Care Gap present. Most recent result on file      If the patient is female:    4. For patients aged 41-77: Has the patient had a mammogram within the past 2 years? Yes - Care Gap present. Most recent result on file      5. For patients aged 21-65: Has the patient had a pap smear?  NA - based on age or sex

## 2023-03-09 ENCOUNTER — DOCUMENTATION ONLY (OUTPATIENT)
Dept: INTERNAL MEDICINE CLINIC | Age: 73
End: 2023-03-09

## 2023-03-09 ENCOUNTER — HOSPITAL ENCOUNTER (OUTPATIENT)
Dept: MRI IMAGING | Age: 73
Discharge: HOME | End: 2023-03-09
Attending: PODIATRIST
Payer: MEDICARE

## 2023-03-09 DIAGNOSIS — M66.871 NONTRAUMATIC TEAR OF TIBIALIS POSTERIOR TENDON, RIGHT: ICD-10-CM

## 2023-03-09 PROCEDURE — 73718 MRI LOWER EXTREMITY W/O DYE: CPT

## 2023-04-07 ENCOUNTER — HOSPITAL ENCOUNTER (OUTPATIENT)
Dept: CT IMAGING | Age: 73
End: 2023-04-07
Attending: ORTHOPAEDIC SURGERY
Payer: MEDICARE

## 2023-04-17 ENCOUNTER — OFFICE VISIT (OUTPATIENT)
Dept: ORTHOPEDIC SURGERY | Age: 73
End: 2023-04-17

## 2023-04-17 DIAGNOSIS — R26.2 DIFFICULTY WALKING: ICD-10-CM

## 2023-04-17 DIAGNOSIS — M79.671 RIGHT FOOT PAIN: Primary | ICD-10-CM

## 2023-04-17 DIAGNOSIS — M66.872 NONTRAUMATIC RUPTURE OF LEFT POSTERIOR TIBIAL TENDON: ICD-10-CM

## 2023-04-17 NOTE — PROGRESS NOTES
Patient Name: Zafar Akbar  Date:2023  : 1950  [x]  Patient  Verified  Payor: Vaishnavi Sosa / Plan: [place] MED[place]ARE PART [place] & B / Product Type: Medicare /    Total Treatment Time (min): 60  1:1 Treatment Time ( [address] only): 40  Referring provider: Mae Martinez MD  1. Right foot pain  2. Difficulty walking  3. Nontraumatic rupture of left posterior tibial tendon      SUBJECTIVE  Patient reports navicular sling taping last session was very helpful while it was in place. Soreness/ache has returned without tape. Verbalizes compliance with HEP. States she is tolerating being out of boot longer around her home. OBJECTIVE  Modality:   []  E-Stim: type _ x _ min     []att   []unatt   []w/US   []w/ice   []w/heat  []  Ultrasound: []cont   []pulse    _ W/cm2 x _  min   []1MHz   []3MHz  []  Ice pack _  Post       [] Hot pack _  Pre       []  Other:    Man: 30 min    Superficial and deep myofascial techniques to the plantar surface of the foot, plantar fascia, and posterior to the medial malleolus along the peroneal musculature. Talocrural mobilization to facilitate greater ankle mobility. K taping applied in a navicular sling pattern for arch control. NMR:  min  Neuromuscular reeducation/proprioceptive training listed in exercise below. Ex: 10 min  Therapeutic exercise/strength/endurance completed here in clinic today per the exercise log. PT Exercise Log         EXERCISE 2023   Nu-step 10'   Slant board y   Lat Walk green                                                                            Ex: 10 min  Man: 30 min  NMR:  min    ASSESSMENT  [x]  See Plan of Care  [x]  Patient will continue to benefit from skilled therapy to address remaining functional deficits:   Tender to palpation throughout the plantar fascia and peroneal musculature but feels good with arch support and exercise here in clinic today.   Verbalizes decreased pain at end of session as compared to arrival.    PLAN  Continue with current plan of care and progress as appropriate towards functional goals.   [x]  Upgrade activities as tolerated     [x]  Continue plan of care  []  Discharge due to:_  [] Other:_       Kika Santillan PT, DPT  4/17/2023    9:15 AM

## 2023-04-20 ENCOUNTER — OFFICE VISIT (OUTPATIENT)
Dept: ORTHOPEDIC SURGERY | Age: 73
End: 2023-04-20

## 2023-04-20 DIAGNOSIS — M66.872 NONTRAUMATIC RUPTURE OF LEFT POSTERIOR TIBIAL TENDON: ICD-10-CM

## 2023-04-20 DIAGNOSIS — M79.671 RIGHT FOOT PAIN: Primary | ICD-10-CM

## 2023-04-20 DIAGNOSIS — R26.2 DIFFICULTY WALKING: ICD-10-CM

## 2023-04-20 NOTE — PROGRESS NOTES
Patient Name: Tami Gordon  Date:2023  : 1950  [x]  Patient  Verified  Payor: Shane Point / Plan: [place] MEDICARE PART A & B / Product Type: Medicare /    Total Treatment Time (min): 60  1:1 Treatment Time ( [address] only): 40  Referring provider: Marcos Glasgow MD  1. Right foot pain  2. Difficulty walking  3. Nontraumatic rupture of left posterior tibial tendon      SUBJECTIVE  Patient notes pain with decreasing dependence on walking boot. Feels taping has been very helpful. Trial arrival to clinic in regular shoewear rather than boot. OBJECTIVE  Modality:   []  E-Stim: type _ x _ min     []att   []unatt   []w/US   []w/ice   []w/heat  []  Ultrasound: []cont   []pulse    _ W/cm2 x _  min   []1MHz   []3MHz  []  Ice pack _  Post     declines  [] Hot pack _  Pre       []  Other:    Man: 30 min    Superficial and deep myofascial techniques to the plantar surface of the foot, plantar fascia, and posterior to the medial malleolus along the peroneal musculature. Talocrural mobilization to facilitate greater ankle mobility. K taping applied in a navicular sling pattern for arch control. NMR:  min  Neuromuscular reeducation/proprioceptive training listed in exercise below. Ex: 10 min  Therapeutic exercise/strength/endurance completed here in clinic today per the exercise log. PT Exercise Log         EXERCISE 2023   Nu-step 10'   Slant board y   Lat Walk green                                                                            Ex: 10 min  Man: 30 min  NMR:  min    ASSESSMENT  [x]  See Plan of Care  [x]  Patient will continue to benefit from skilled therapy to address remaining functional deficits:     Still without full symptom relief but patient notes decreased pain with manual portion of treatment today. Improving tolerance to ADLs in standing - even with early transition to regular shoewear.      PLAN  Continue with current plan of care and progress as appropriate towards functional goals.   [x]  Upgrade activities as tolerated     [x]  Continue plan of care  []  Discharge due to:_  [] Other:_       Navdeep Puri PT, DPT  4/20/2023    9:15 AM

## 2023-04-25 ENCOUNTER — OFFICE VISIT (OUTPATIENT)
Dept: ORTHOPEDIC SURGERY | Age: 73
End: 2023-04-25
Payer: MEDICARE

## 2023-04-25 DIAGNOSIS — M79.671 RIGHT FOOT PAIN: Primary | ICD-10-CM

## 2023-04-25 DIAGNOSIS — R26.2 DIFFICULTY WALKING: ICD-10-CM

## 2023-04-25 DIAGNOSIS — M66.872 NONTRAUMATIC RUPTURE OF LEFT POSTERIOR TIBIAL TENDON: ICD-10-CM

## 2023-04-25 PROCEDURE — 97110 THERAPEUTIC EXERCISES: CPT | Performed by: PHYSICAL THERAPIST

## 2023-04-25 PROCEDURE — 97140 MANUAL THERAPY 1/> REGIONS: CPT | Performed by: PHYSICAL THERAPIST

## 2023-04-25 NOTE — PROGRESS NOTES
Patient Name: Rajinder Pierson  Date:2023  : 1950  [x]  Patient  Verified  Payor: Christopher Titus / Plan: VA MEDICARE PART A & B / Product Type: Medicare /    Total Treatment Time (min): 60  1:1 Treatment Time ( [address] only): 30  Referring provider: Cristy Alva MD  1. Right foot pain  2. Difficulty walking  3. Nontraumatic rupture of left posterior tibial tendon      SUBJECTIVE  Occasional swelling but improves by next AM. Has been without tape for about a day and has felt good. ADLs in regular shoewear more often. OBJECTIVE  Modality:   []  E-Stim: type _ x _ min     []att   []unatt   []w/US   []w/ice   []w/heat  []  Ultrasound: []cont   []pulse    _ W/cm2 x _  min   []1MHz   []3MHz  []  Ice pack _  Post     declines  [] Hot pack _  Pre       []  Other:    Man: 15 min    Superficial and deep myofascial techniques to the plantar surface of the foot, plantar fascia, and posterior to the medial malleolus along the peroneal musculature. Talocrural mobilization to facilitate greater ankle mobility. NMR:  min  Neuromuscular reeducation/proprioceptive training listed in exercise below. Ex: 15 min  Therapeutic exercise/strength/endurance completed here in clinic today per the exercise log. PT Exercise Log         EXERCISE 2023   Nu-step 10'   Slant board y   Lat Walk green   tandem y                                                                        Ex: 15 min  Man: 15 min  NMR:  min    ASSESSMENT  [x]  See Plan of Care  [x]  Patient will continue to benefit from skilled therapy to address remaining functional deficits:     Continues to progress well with current plan of care. Subjectively increasing activity with decreasing pain. PLAN  Continue with current plan of care and progress as appropriate towards functional goals. [x]  Upgrade activities as tolerated     [x]  Continue plan of care  []  Discharge due to:_  [] Other:_ assess response to no tape today.        Obed Avendano MUNIR Lind, PT, DPT  4/25/2023    9:15 AM

## 2023-04-27 ENCOUNTER — OFFICE VISIT (OUTPATIENT)
Dept: ORTHOPEDIC SURGERY | Age: 73
End: 2023-04-27
Payer: MEDICARE

## 2023-04-27 DIAGNOSIS — R26.2 DIFFICULTY WALKING: ICD-10-CM

## 2023-04-27 DIAGNOSIS — M66.872 NONTRAUMATIC RUPTURE OF LEFT POSTERIOR TIBIAL TENDON: ICD-10-CM

## 2023-04-27 DIAGNOSIS — M79.671 RIGHT FOOT PAIN: Primary | ICD-10-CM

## 2023-04-27 PROCEDURE — 97140 MANUAL THERAPY 1/> REGIONS: CPT | Performed by: PHYSICAL THERAPIST

## 2023-04-27 PROCEDURE — 97110 THERAPEUTIC EXERCISES: CPT | Performed by: PHYSICAL THERAPIST

## 2023-04-27 NOTE — PROGRESS NOTES
Patient Name: Leona Esparza  Date:2023  : 1950  [x]  Patient  Verified  Payor: Gamal Sit / Plan: VA MEDICARE PART A & B / Product Type: Medicare /    Total Treatment Time (min): 50  1:1 Treatment Time ( [address] only): 40  Referring provider: Jessica Wilson MD  1. Right foot pain  2. Difficulty walking  3. Nontraumatic rupture of left posterior tibial tendon      SUBJECTIVE  Patient reports she has had some minor soreness but otherwise foot pain has felt good, even without taping over the past several days. Patient is to see MD next week. OBJECTIVE  Modality:   []  E-Stim: type _ x _ min     []att   []unatt   []w/US   []w/ice   []w/heat  []  Ultrasound: []cont   []pulse    _ W/cm2 x _  min   []1MHz   []3MHz  []  Ice pack _  Post     declines  [] Hot pack _  Pre       []  Other:    Man: 25 min    Superficial and deep myofascial techniques to the plantar surface of the foot, plantar fascia, and posterior to the medial malleolus along the peroneal musculature. Talocrural mobilization to facilitate greater ankle mobility. NMR:  min  Neuromuscular reeducation/proprioceptive training listed in exercise below. Ex: 15 min  Therapeutic exercise/strength/endurance completed here in clinic today per the exercise log. PT Exercise Log         EXERCISE 2023   Nu-step 10'   Slant board y   Lat Walk green   tandem y   [de-identified] y                                                                    Ex: 15 min  Man: 25 min  NMR:  min    ASSESSMENT  [x]  See Plan of Care  [x]  Patient will continue to benefit from skilled therapy to address remaining functional deficits:     Patient is making good progress towards goals of decreased pain. Reporting increased ADL activity and standing without reproduction of symptoms. PLAN  Continue with current plan of care and progress as appropriate towards functional goals.   [x]  Upgrade activities as tolerated     [x]  Continue plan of care  [] Discharge due to:_  [] Other:_       Jose Juan Loyola, PT, DPT  4/27/2023    9:15 AM

## 2023-05-02 ENCOUNTER — OFFICE VISIT (OUTPATIENT)
Dept: ORTHOPEDIC SURGERY | Age: 73
End: 2023-05-02

## 2023-05-02 VITALS — WEIGHT: 189 LBS | HEIGHT: 63 IN | BODY MASS INDEX: 33.49 KG/M2

## 2023-05-02 DIAGNOSIS — M66.872 NONTRAUMATIC RUPTURE OF LEFT POSTERIOR TIBIAL TENDON: ICD-10-CM

## 2023-05-02 DIAGNOSIS — M79.671 RIGHT FOOT PAIN: Primary | ICD-10-CM

## 2023-05-02 DIAGNOSIS — M76.821 POSTERIOR TIBIAL TENDINITIS OF RIGHT LOWER EXTREMITY: Primary | ICD-10-CM

## 2023-05-02 DIAGNOSIS — R26.2 DIFFICULTY WALKING: ICD-10-CM

## 2023-05-02 PROCEDURE — 97110 THERAPEUTIC EXERCISES: CPT | Performed by: PHYSICAL THERAPIST

## 2023-05-02 PROCEDURE — 97140 MANUAL THERAPY 1/> REGIONS: CPT | Performed by: PHYSICAL THERAPIST

## 2023-08-28 ASSESSMENT — ENCOUNTER SYMPTOMS: SHORTNESS OF BREATH: 0

## 2023-09-03 SDOH — ECONOMIC STABILITY: FOOD INSECURITY: WITHIN THE PAST 12 MONTHS, THE FOOD YOU BOUGHT JUST DIDN'T LAST AND YOU DIDN'T HAVE MONEY TO GET MORE.: NEVER TRUE

## 2023-09-03 SDOH — ECONOMIC STABILITY: HOUSING INSECURITY
IN THE LAST 12 MONTHS, WAS THERE A TIME WHEN YOU DID NOT HAVE A STEADY PLACE TO SLEEP OR SLEPT IN A SHELTER (INCLUDING NOW)?: NO

## 2023-09-03 SDOH — ECONOMIC STABILITY: FOOD INSECURITY: WITHIN THE PAST 12 MONTHS, YOU WORRIED THAT YOUR FOOD WOULD RUN OUT BEFORE YOU GOT MONEY TO BUY MORE.: NEVER TRUE

## 2023-09-03 SDOH — ECONOMIC STABILITY: TRANSPORTATION INSECURITY
IN THE PAST 12 MONTHS, HAS LACK OF TRANSPORTATION KEPT YOU FROM MEETINGS, WORK, OR FROM GETTING THINGS NEEDED FOR DAILY LIVING?: NO

## 2023-09-03 SDOH — HEALTH STABILITY: PHYSICAL HEALTH: ON AVERAGE, HOW MANY DAYS PER WEEK DO YOU ENGAGE IN MODERATE TO STRENUOUS EXERCISE (LIKE A BRISK WALK)?: 0 DAYS

## 2023-09-03 SDOH — ECONOMIC STABILITY: INCOME INSECURITY: HOW HARD IS IT FOR YOU TO PAY FOR THE VERY BASICS LIKE FOOD, HOUSING, MEDICAL CARE, AND HEATING?: NOT HARD AT ALL

## 2023-09-03 ASSESSMENT — PATIENT HEALTH QUESTIONNAIRE - PHQ9
2. FEELING DOWN, DEPRESSED OR HOPELESS: 0
SUM OF ALL RESPONSES TO PHQ9 QUESTIONS 1 & 2: 0
1. LITTLE INTEREST OR PLEASURE IN DOING THINGS: 0
SUM OF ALL RESPONSES TO PHQ QUESTIONS 1-9: 0

## 2023-09-03 ASSESSMENT — LIFESTYLE VARIABLES
HOW OFTEN DO YOU HAVE SIX OR MORE DRINKS ON ONE OCCASION: 1
HOW OFTEN DO YOU HAVE A DRINK CONTAINING ALCOHOL: NEVER
HOW MANY STANDARD DRINKS CONTAINING ALCOHOL DO YOU HAVE ON A TYPICAL DAY: PATIENT DOES NOT DRINK

## 2023-09-05 ENCOUNTER — OFFICE VISIT (OUTPATIENT)
Age: 73
End: 2023-09-05
Payer: MEDICARE

## 2023-09-05 VITALS
WEIGHT: 188 LBS | HEART RATE: 75 BPM | SYSTOLIC BLOOD PRESSURE: 113 MMHG | OXYGEN SATURATION: 99 % | DIASTOLIC BLOOD PRESSURE: 65 MMHG | HEIGHT: 63 IN | TEMPERATURE: 98.3 F | RESPIRATION RATE: 14 BRPM | BODY MASS INDEX: 33.31 KG/M2

## 2023-09-05 DIAGNOSIS — E66.9 OBESITY (BMI 30.0-34.9): ICD-10-CM

## 2023-09-05 DIAGNOSIS — E11.9 CONTROLLED TYPE 2 DIABETES MELLITUS WITHOUT COMPLICATION, WITHOUT LONG-TERM CURRENT USE OF INSULIN (HCC): Primary | ICD-10-CM

## 2023-09-05 DIAGNOSIS — E78.2 MIXED HYPERLIPIDEMIA: ICD-10-CM

## 2023-09-05 DIAGNOSIS — E55.9 HYPOVITAMINOSIS D: ICD-10-CM

## 2023-09-05 DIAGNOSIS — E03.9 ACQUIRED HYPOTHYROIDISM: ICD-10-CM

## 2023-09-05 DIAGNOSIS — Z00.00 MEDICARE ANNUAL WELLNESS VISIT, SUBSEQUENT: ICD-10-CM

## 2023-09-05 PROCEDURE — G0439 PPPS, SUBSEQ VISIT: HCPCS | Performed by: INTERNAL MEDICINE

## 2023-09-05 PROCEDURE — 99213 OFFICE O/P EST LOW 20 MIN: CPT | Performed by: INTERNAL MEDICINE

## 2023-09-05 PROCEDURE — G8428 CUR MEDS NOT DOCUMENT: HCPCS | Performed by: INTERNAL MEDICINE

## 2023-09-05 PROCEDURE — 4004F PT TOBACCO SCREEN RCVD TLK: CPT | Performed by: INTERNAL MEDICINE

## 2023-09-05 PROCEDURE — 1123F ACP DISCUSS/DSCN MKR DOCD: CPT | Performed by: INTERNAL MEDICINE

## 2023-09-05 PROCEDURE — 1090F PRES/ABSN URINE INCON ASSESS: CPT | Performed by: INTERNAL MEDICINE

## 2023-09-05 PROCEDURE — 2022F DILAT RTA XM EVC RTNOPTHY: CPT | Performed by: INTERNAL MEDICINE

## 2023-09-05 PROCEDURE — G8417 CALC BMI ABV UP PARAM F/U: HCPCS | Performed by: INTERNAL MEDICINE

## 2023-09-05 PROCEDURE — 3046F HEMOGLOBIN A1C LEVEL >9.0%: CPT | Performed by: INTERNAL MEDICINE

## 2023-09-05 PROCEDURE — G8399 PT W/DXA RESULTS DOCUMENT: HCPCS | Performed by: INTERNAL MEDICINE

## 2023-09-05 PROCEDURE — 3017F COLORECTAL CA SCREEN DOC REV: CPT | Performed by: INTERNAL MEDICINE

## 2023-12-05 LAB
ESTIMATED AVERAGE GLUCOSE: NORMAL
HBA1C MFR BLD: 6 %

## 2024-04-09 LAB
HBA1C MFR BLD HPLC: 6.1 %
LDL CHOLESTEROL, EXTERNAL: 146

## 2024-04-16 NOTE — PROGRESS NOTES
HISTORY OF PRESENT ILLNESS  Italia Jeffery is a 74 y.o. female.  HPI    Last here 23. Pt is here to f/u on chronic conditions.  Presents with her  who helps provide hx      BP today is 134/75  ACE Inhibitor or ARB therapy not prescibed for patient reasons--not interested with nl bp .     She is diabetic   BS 80s 8890  Continues metformin XR 500mg 2 per day per Dr. Pathak   No longer on ozempic  She also takes ASA 81mg daily      Pt follows with Dr. Pathak ([facility]) for her thyroid and diabetes  Last visit was , f/u scheduled    taking synthroid 100 mcg (decreased from 88 mcg)  and cytomel 5mg daily for hypothyroidism      Wt today is 188 lbs, stable since lov   Discussed diet and wt/l        Ordered labs  Reviewed labs from Dr Pathak : A1c 6.1 TSH 1.7               She previously saw Dr. Ernestina Torres (ortho) 10/19 for ankle pain may go back if her foot is not improving  Saw her again 23 for R foot pain     No longer taking Lasix       Recall pt had myalgias on crestor not interested in starting statin  Previously I ordered Zetia 10 mg, however this gave her side effects  so currently just working on diet for now not interested in medication     Continues on 2000U daily of vitamin D      Pt lives w/ her      ACP not on file. SDM is her , Homero Jackson.  Provided information in the past.      PREVENTIVE:   Colonoscopy:   Dr. Rivas, 10/04/16, repeat 10 years  Pap: YOLY Garibay,    Mammogram: [facility],   DEXA: 22 normal repeat in 5 years  AAA: denies fmhx   Foot exam: 23   Microalbumin:  poc   A1c:  Kapros 5.8   6.0  Kapros 6.1  [facility] exam: [facility]. Kemp at [facility], , had cataract surgery, f/u scheduled   Lipids:  Kapros   Hep C screen: , negative   EK17, nsr    Covid: 4 doses (pfizer), including bivalent   Declines all immunizations except covid  Patient Active Problem List   Diagnosis    Shingles

## 2024-04-23 ENCOUNTER — OFFICE VISIT (OUTPATIENT)
Age: 74
End: 2024-04-23
Payer: MEDICARE

## 2024-04-23 VITALS
TEMPERATURE: 98 F | SYSTOLIC BLOOD PRESSURE: 134 MMHG | BODY MASS INDEX: 33.31 KG/M2 | WEIGHT: 188 LBS | DIASTOLIC BLOOD PRESSURE: 75 MMHG | HEART RATE: 66 BPM | OXYGEN SATURATION: 96 % | RESPIRATION RATE: 20 BRPM | HEIGHT: 63 IN

## 2024-04-23 DIAGNOSIS — E03.9 ACQUIRED HYPOTHYROIDISM: ICD-10-CM

## 2024-04-23 DIAGNOSIS — E66.9 OBESITY (BMI 30.0-34.9): ICD-10-CM

## 2024-04-23 DIAGNOSIS — E11.9 CONTROLLED TYPE 2 DIABETES MELLITUS WITHOUT COMPLICATION, WITHOUT LONG-TERM CURRENT USE OF INSULIN (HCC): Primary | ICD-10-CM

## 2024-04-23 DIAGNOSIS — E78.2 MIXED HYPERLIPIDEMIA: ICD-10-CM

## 2024-04-23 DIAGNOSIS — E55.9 HYPOVITAMINOSIS D: ICD-10-CM

## 2024-04-23 LAB
ALBUMIN, URINE, POC: 30 MG/L
CREATININE, URINE, POC: 300 MG/DL
MICROALB/CREAT RATIO, POC: <30 MG/G

## 2024-04-23 PROCEDURE — 2022F DILAT RTA XM EVC RTNOPTHY: CPT | Performed by: INTERNAL MEDICINE

## 2024-04-23 PROCEDURE — 3046F HEMOGLOBIN A1C LEVEL >9.0%: CPT | Performed by: INTERNAL MEDICINE

## 2024-04-23 PROCEDURE — 4004F PT TOBACCO SCREEN RCVD TLK: CPT | Performed by: INTERNAL MEDICINE

## 2024-04-23 PROCEDURE — 1123F ACP DISCUSS/DSCN MKR DOCD: CPT | Performed by: INTERNAL MEDICINE

## 2024-04-23 PROCEDURE — 99214 OFFICE O/P EST MOD 30 MIN: CPT | Performed by: INTERNAL MEDICINE

## 2024-04-23 PROCEDURE — G8399 PT W/DXA RESULTS DOCUMENT: HCPCS | Performed by: INTERNAL MEDICINE

## 2024-04-23 PROCEDURE — 1090F PRES/ABSN URINE INCON ASSESS: CPT | Performed by: INTERNAL MEDICINE

## 2024-04-23 PROCEDURE — 3017F COLORECTAL CA SCREEN DOC REV: CPT | Performed by: INTERNAL MEDICINE

## 2024-04-23 PROCEDURE — 82044 UR ALBUMIN SEMIQUANTITATIVE: CPT | Performed by: INTERNAL MEDICINE

## 2024-04-23 PROCEDURE — PBSHW AMB POC URINE, MICROALBUMIN, SEMIQUANT (3 RESULTS): Performed by: INTERNAL MEDICINE

## 2024-04-23 PROCEDURE — G8417 CALC BMI ABV UP PARAM F/U: HCPCS | Performed by: INTERNAL MEDICINE

## 2024-04-23 PROCEDURE — G8427 DOCREV CUR MEDS BY ELIG CLIN: HCPCS | Performed by: INTERNAL MEDICINE

## 2024-04-23 ASSESSMENT — PATIENT HEALTH QUESTIONNAIRE - PHQ9
SUM OF ALL RESPONSES TO PHQ9 QUESTIONS 1 & 2: 0
SUM OF ALL RESPONSES TO PHQ QUESTIONS 1-9: 0
2. FEELING DOWN, DEPRESSED OR HOPELESS: NOT AT ALL
SUM OF ALL RESPONSES TO PHQ QUESTIONS 1-9: 0
1. LITTLE INTEREST OR PLEASURE IN DOING THINGS: NOT AT ALL
SUM OF ALL RESPONSES TO PHQ QUESTIONS 1-9: 0
SUM OF ALL RESPONSES TO PHQ QUESTIONS 1-9: 0

## 2024-04-23 NOTE — PROGRESS NOTES
\"Have you been to the ER, urgent care clinic since your last visit?  Hospitalized since your last visit?\"    NO    “Have you seen or consulted any other health care providers outside of [facility] since your last visit?”    NO            Click Here for Release of Records Request

## 2024-10-21 NOTE — PROGRESS NOTES
HISTORY OF PRESENT ILLNESS  Italia Jeffery is a 74 y.o. female.  HPI  Last here 4/23/24. Pt is here to f/u on chronic conditions.  Presents with her  who helps provide hx    She notes R foot and calf pain starting this weekend one morning when she woke up. She notes no improvement. She has been wearing an ankle brace, used ibuprofen, and has not changed. She states it feels as though it is cramping. The pain is the worst in the lower calf, her R leg is slightly more swollen than her L leg but there is no significant swelling. We were able to get her a duplex scheduled for today       BP today is 116/74  ACE Inhibitor or ARB therapy not prescibed for patient reasons--not interested with nl bp     She is diabetic   BS: 80s  Continues metformin XR 500mg 2 per day per Dr. Pathak   No longer on ozempic  She also takes ASA 81mg daily      Pt follows with Dr. Pathak ([facility]) for her thyroid and diabetes  Last visit was 9/24   taking synthroid 100 mcg and cytomel 5mg daily for hypothyroidism      Wt today is 187 lbs, stable since lov   Discussed diet and wt/l      Reviewed labs 9/10/24   ldl 125 lfts normal, cr normal, electrolytes normal, A1c 6.1 tsh normal, vit d nl, vit b nl  Ordered labs       She previously saw Dr. Ernestina Torres (ortho) 10/19 for ankle pain may go back if her foot is not improving  Saw her again 5/2/23 for R foot pain  See above symptoms     No longer taking Lasix every day, infrequently PRN        Recall pt had myalgias on crestor not interested in starting statin  Previously I ordered Zetia 10 mg, however this gave her side effects so currently just working on diet for now not interested in medication     Continues on 2000U daily of vitamin D      Pt lives w/ her      ACP not on file. SDM is her , Homero Jackson.  Provided information in the past.      PREVENTIVE:   Colonoscopy:   Dr. Rivas, 10/04/16, repeat 10 years  Pap: YOLY Garibay, 8/24  Mammogram: [facility], 8/24 - nl

## 2024-10-26 SDOH — ECONOMIC STABILITY: FOOD INSECURITY: WITHIN THE PAST 12 MONTHS, YOU WORRIED THAT YOUR FOOD WOULD RUN OUT BEFORE YOU GOT MONEY TO BUY MORE.: NEVER TRUE

## 2024-10-26 SDOH — ECONOMIC STABILITY: FOOD INSECURITY: WITHIN THE PAST 12 MONTHS, THE FOOD YOU BOUGHT JUST DIDN'T LAST AND YOU DIDN'T HAVE MONEY TO GET MORE.: NEVER TRUE

## 2024-10-26 SDOH — ECONOMIC STABILITY: INCOME INSECURITY: HOW HARD IS IT FOR YOU TO PAY FOR THE VERY BASICS LIKE FOOD, HOUSING, MEDICAL CARE, AND HEATING?: NOT HARD AT ALL

## 2024-10-27 NOTE — PROGRESS NOTES
Medicare Annual Wellness Visit    Italia Jeffery is here for Medicare AWV    Assessment & Plan   Controlled type 2 diabetes mellitus without complication, without long-term current use of insulin ([facility])  -      DIABETES FOOT EXAM  Acquired hypothyroidism  Hypovitaminosis D  Obesity (BMI 30.0-34.9)  Mixed hyperlipidemia  Medicare annual wellness visit, subsequent  Foot pain, right  -     [facility] - Ernestina Torres MD, Orthopedic Surgery (foot, ankle), [place]  Leg edema, right  -     Vascular duplex lower extremity venous right; Future     Recommendations for Preventive Services Due: see orders and patient instructions/AVS.  Recommended screening schedule for the next 5-10 years is provided to the patient in written form: see Patient Instructions/AVS.     Return in about 6 months (around 4/29/2025).     Subjective       Patient's complete Health Risk Assessment and screening values have been reviewed and are found in Flowsheets. The following problems were reviewed today and where indicated follow up appointments were made and/or referrals ordered.    Positive Risk Factor Screenings with Interventions:                Inactivity:  On average, how many days per week do you engage in moderate to strenuous exercise (like a brisk walk)?: 0 days (!) Abnormal  On average, how many minutes do you engage in exercise at this level?: 0 min  Interventions:  Increase walking     Abnormal BMI (obese):  Body mass index is 33.13 kg/m². (!) Abnormal  Interventions:  low carbohydrate diet                       Objective   Vitals:    10/29/24 1337   BP: 116/74   Site: Left Upper Arm   Position: Sitting   Pulse: 70   Resp: 18   Temp: 98.6 °F (37 °C)   TempSrc: Temporal   SpO2: 99%   Weight: 84.8 kg (187 lb)   Height: 1.6 m (5' 3\")      Body mass index is 33.13 kg/m².                    Allergies   Allergen Reactions    Aspirin Hives    Nsaids Hives    Oxycodone-Acetaminophen Swelling     Wrong medication---patient says it was

## 2024-10-27 NOTE — PATIENT INSTRUCTIONS
recommended every 6 months.  Try to get at least 150 minutes of exercise per week or 10,000 steps per day on a pedometer .  Order or download the FREE \"Exercise & Physical Activity: Your Everyday Guide\" from [employer]. Call 1-741.762.5538 or search [employer] online.  You need 0218-1701 mg of calcium and 0309-2759 IU of vitamin D per day. It is possible to meet your calcium requirement with diet alone, but a vitamin D supplement is usually necessary to meet this goal.  When exposed to the sun, use a sunscreen that protects against both UVA and UVB radiation with an SPF of 30 or greater. Reapply every 2 to 3 hours or after sweating, drying off with a towel, or swimming.  Always wear a seat belt when traveling in a car. Always wear a helmet when riding a bicycle or motorcycle.

## 2024-10-29 ENCOUNTER — HOSPITAL ENCOUNTER (OUTPATIENT)
Facility: HOSPITAL | Age: 74
Discharge: HOME | End: 2024-11-01
Attending: INTERNAL MEDICINE
Payer: MEDICARE

## 2024-10-29 ENCOUNTER — OFFICE VISIT (OUTPATIENT)
Age: 74
End: 2024-10-29
Attending: INTERNAL MEDICINE
Payer: MEDICARE

## 2024-10-29 VITALS
WEIGHT: 187 LBS | RESPIRATION RATE: 18 BRPM | HEIGHT: 63 IN | TEMPERATURE: 98.6 F | SYSTOLIC BLOOD PRESSURE: 116 MMHG | DIASTOLIC BLOOD PRESSURE: 74 MMHG | OXYGEN SATURATION: 99 % | HEART RATE: 70 BPM | BODY MASS INDEX: 33.13 KG/M2

## 2024-10-29 DIAGNOSIS — E55.9 HYPOVITAMINOSIS D: ICD-10-CM

## 2024-10-29 DIAGNOSIS — R60.0 LEG EDEMA, RIGHT: ICD-10-CM

## 2024-10-29 DIAGNOSIS — Z00.00 MEDICARE ANNUAL WELLNESS VISIT, SUBSEQUENT: ICD-10-CM

## 2024-10-29 DIAGNOSIS — E78.2 MIXED HYPERLIPIDEMIA: ICD-10-CM

## 2024-10-29 DIAGNOSIS — E03.9 ACQUIRED HYPOTHYROIDISM: ICD-10-CM

## 2024-10-29 DIAGNOSIS — E11.9 CONTROLLED TYPE 2 DIABETES MELLITUS WITHOUT COMPLICATION, WITHOUT LONG-TERM CURRENT USE OF INSULIN (HCC): Primary | ICD-10-CM

## 2024-10-29 DIAGNOSIS — M79.671 FOOT PAIN, RIGHT: ICD-10-CM

## 2024-10-29 DIAGNOSIS — E66.811 OBESITY (BMI 30.0-34.9): ICD-10-CM

## 2024-10-29 LAB — ECHO BSA: 1.94 M2

## 2024-10-29 PROCEDURE — 99214 OFFICE O/P EST MOD 30 MIN: CPT | Performed by: INTERNAL MEDICINE

## 2024-10-29 PROCEDURE — 93971 EXTREMITY STUDY: CPT

## 2024-10-29 ASSESSMENT — LIFESTYLE VARIABLES
HOW MANY STANDARD DRINKS CONTAINING ALCOHOL DO YOU HAVE ON A TYPICAL DAY: 1 OR 2
HOW OFTEN DO YOU HAVE A DRINK CONTAINING ALCOHOL: 2-4 TIMES A MONTH

## 2024-10-29 ASSESSMENT — PATIENT HEALTH QUESTIONNAIRE - PHQ9
SUM OF ALL RESPONSES TO PHQ QUESTIONS 1-9: 0
SUM OF ALL RESPONSES TO PHQ QUESTIONS 1-9: 0
2. FEELING DOWN, DEPRESSED OR HOPELESS: NOT AT ALL
SUM OF ALL RESPONSES TO PHQ QUESTIONS 1-9: 0
SUM OF ALL RESPONSES TO PHQ9 QUESTIONS 1 & 2: 0
SUM OF ALL RESPONSES TO PHQ QUESTIONS 1-9: 0
1. LITTLE INTEREST OR PLEASURE IN DOING THINGS: NOT AT ALL

## 2024-10-30 NOTE — RESULT ENCOUNTER NOTE
Called pt, verified two pt identifiers.   Informed pt imaging shows no DVT  Pt verifies understanding, is thankful, no further questions

## 2025-05-01 LAB
HBA1C MFR BLD HPLC: 5.9 %
LDL CHOLESTEROL, EXTERNAL: 160

## 2025-05-19 NOTE — PROGRESS NOTES
HISTORY OF PRESENT ILLNESS  Italia Jeffery is a 75 y.o. female.  HPI  Last here 10/29/24. Pt is here to f/u on chronic conditions.  Presents with her  who helps provide hx           BP today is xx  ACE Inhibitor or ARB therapy not prescibed for patient reasons--not interested with nl bp     She is diabetic   BS:   Continues metformin XR 500mg 2 per day per Dr. Pathak   No longer on ozempic  She also takes ASA 81mg daily      Pt follows with Dr. Pathak ([facility]) for her thyroid and diabetes  Last visit was 9/24   taking synthroid 100 mcg and cytomel 5mg daily for hypothyroidism      Wt today is xx lbs, lov 187 lbs, stable since lov   Discussed diet and wt/l      Reviewed labs   Ordered labs     She had R foot and calf pain lov    Reviewed duplex 10/29/24    No evidence of deep vein or superficial vein thrombosis in the right lower extremity. Vessels demonstrate normal compressibility, color filling, and phasic and spontaneous flow.     She previously saw Dr. Ernestina Torres (ortho) 10/19 for ankle pain may go back if her foot is not improving  Saw her again 5/2/23 for R foot pain  See above symptoms     No longer taking Lasix every day, infrequently PRN        Recall pt had myalgias on crestor not interested in starting statin  Previously I ordered Zetia 10 mg, however this gave her side effects so currently just working on diet for now not interested in medication     Continues on 2000U daily of vitamin D      Pt lives w/ her      ACP not on file. SDM is her , Homero Jackson.  Provided information in the past.      PREVENTIVE:   Colonoscopy:   Dr. Rivas, 10/04/16, repeat 10 years  Pap: NP Arabella Garibay, 8/24  Mammogram: [facility], 8/24 - nl per pt  DEXA: 1/24/22 normal repeat in 5 years  AAA: denies fmhx   Foot exam: 10/29/24  Microalbumin:  poc 4/24  A1c: 6/23 Kapros 5.8 12/23  6.0 4/24 Kapros 6.1 9/24 Kapros 6.1  Eye exam: Dr. Kemp at [facility], 8/24, had cataract surgery previously  Lipids: 9/24

## 2025-05-30 SDOH — ECONOMIC STABILITY: FOOD INSECURITY: WITHIN THE PAST 12 MONTHS, YOU WORRIED THAT YOUR FOOD WOULD RUN OUT BEFORE YOU GOT MONEY TO BUY MORE.: NEVER TRUE

## 2025-05-30 SDOH — ECONOMIC STABILITY: FOOD INSECURITY: WITHIN THE PAST 12 MONTHS, THE FOOD YOU BOUGHT JUST DIDN'T LAST AND YOU DIDN'T HAVE MONEY TO GET MORE.: NEVER TRUE

## 2025-05-30 SDOH — ECONOMIC STABILITY: INCOME INSECURITY: IN THE LAST 12 MONTHS, WAS THERE A TIME WHEN YOU WERE NOT ABLE TO PAY THE MORTGAGE OR RENT ON TIME?: NO

## 2025-05-30 SDOH — ECONOMIC STABILITY: TRANSPORTATION INSECURITY
IN THE PAST 12 MONTHS, HAS THE LACK OF TRANSPORTATION KEPT YOU FROM MEDICAL APPOINTMENTS OR FROM GETTING MEDICATIONS?: NO

## 2025-06-02 ENCOUNTER — HOSPITAL ENCOUNTER (OUTPATIENT)
Facility: HOSPITAL | Age: 75
Discharge: HOME | End: 2025-06-05
Payer: MEDICARE

## 2025-06-02 ENCOUNTER — OFFICE VISIT (OUTPATIENT)
Age: 75
End: 2025-06-02
Payer: MEDICARE

## 2025-06-02 VITALS
DIASTOLIC BLOOD PRESSURE: 65 MMHG | OXYGEN SATURATION: 98 % | HEART RATE: 75 BPM | BODY MASS INDEX: 33.38 KG/M2 | TEMPERATURE: 97 F | HEIGHT: 63 IN | SYSTOLIC BLOOD PRESSURE: 123 MMHG | WEIGHT: 188.38 LBS

## 2025-06-02 DIAGNOSIS — M25.551 HIP PAIN, RIGHT: ICD-10-CM

## 2025-06-02 DIAGNOSIS — E78.2 MIXED HYPERLIPIDEMIA: ICD-10-CM

## 2025-06-02 DIAGNOSIS — E55.9 HYPOVITAMINOSIS D: ICD-10-CM

## 2025-06-02 DIAGNOSIS — E66.811 OBESITY (BMI 30.0-34.9): ICD-10-CM

## 2025-06-02 DIAGNOSIS — E11.9 CONTROLLED TYPE 2 DIABETES MELLITUS WITHOUT COMPLICATION, WITHOUT LONG-TERM CURRENT USE OF INSULIN (HCC): Primary | ICD-10-CM

## 2025-06-02 DIAGNOSIS — E03.9 ACQUIRED HYPOTHYROIDISM: ICD-10-CM

## 2025-06-02 DIAGNOSIS — Z12.11 COLON CANCER SCREENING: ICD-10-CM

## 2025-06-02 DIAGNOSIS — M70.61 TROCHANTERIC BURSITIS OF RIGHT HIP: ICD-10-CM

## 2025-06-02 PROCEDURE — 1036F TOBACCO NON-USER: CPT | Performed by: INTERNAL MEDICINE

## 2025-06-02 PROCEDURE — 1160F RVW MEDS BY RX/DR IN RCRD: CPT | Performed by: INTERNAL MEDICINE

## 2025-06-02 PROCEDURE — G8417 CALC BMI ABV UP PARAM F/U: HCPCS | Performed by: INTERNAL MEDICINE

## 2025-06-02 PROCEDURE — 99214 OFFICE O/P EST MOD 30 MIN: CPT | Performed by: INTERNAL MEDICINE

## 2025-06-02 PROCEDURE — 1123F ACP DISCUSS/DSCN MKR DOCD: CPT | Performed by: INTERNAL MEDICINE

## 2025-06-02 PROCEDURE — 2022F DILAT RTA XM EVC RTNOPTHY: CPT | Performed by: INTERNAL MEDICINE

## 2025-06-02 PROCEDURE — G8399 PT W/DXA RESULTS DOCUMENT: HCPCS | Performed by: INTERNAL MEDICINE

## 2025-06-02 PROCEDURE — 1159F MED LIST DOCD IN RCRD: CPT | Performed by: INTERNAL MEDICINE

## 2025-06-02 PROCEDURE — G8427 DOCREV CUR MEDS BY ELIG CLIN: HCPCS | Performed by: INTERNAL MEDICINE

## 2025-06-02 PROCEDURE — 3046F HEMOGLOBIN A1C LEVEL >9.0%: CPT | Performed by: INTERNAL MEDICINE

## 2025-06-02 PROCEDURE — 3017F COLORECTAL CA SCREEN DOC REV: CPT | Performed by: INTERNAL MEDICINE

## 2025-06-02 PROCEDURE — 73502 X-RAY EXAM HIP UNI 2-3 VIEWS: CPT

## 2025-06-02 PROCEDURE — 1090F PRES/ABSN URINE INCON ASSESS: CPT | Performed by: INTERNAL MEDICINE

## 2025-06-02 RX ORDER — METHYLPREDNISOLONE 4 MG/1
TABLET ORAL
Qty: 1 KIT | Refills: 0 | Status: SHIPPED | OUTPATIENT
Start: 2025-06-02 | End: 2025-06-08

## 2025-06-02 ASSESSMENT — PATIENT HEALTH QUESTIONNAIRE - PHQ9
SUM OF ALL RESPONSES TO PHQ QUESTIONS 1-9: 0
1. LITTLE INTEREST OR PLEASURE IN DOING THINGS: NOT AT ALL
2. FEELING DOWN, DEPRESSED OR HOPELESS: NOT AT ALL
SUM OF ALL RESPONSES TO PHQ QUESTIONS 1-9: 0

## 2025-06-02 ASSESSMENT — ENCOUNTER SYMPTOMS: SHORTNESS OF BREATH: 0

## 2025-06-02 NOTE — PROGRESS NOTES
HISTORY OF PRESENT ILLNESS  Italia Jeffery is a 75 y.o. female.  HPI  Last here 10/29/24. Pt is here to f/u on chronic conditions.  Presents with her  who helps provide hx         BP today is 123/65  ACE Inhibitor or ARB therapy not prescibed for patient reasons--not interested with nl bp     She is diabetic   BS: 80-90  Continues metformin XR 500mg 2 per day per Dr. Pathak   Takes an aspirin 3 days per week  No longer on ozempic  She also takes ASA 81mg daily      Pt follows with Dr. Pathak ([facility]) for her thyroid and diabetes  Last visit was 5/25 reviewed labs from April 30, 2025  taking synthroid 100 mcg and cytomel 5mg daily for hypothyroidism      Wt today is 188 lbs, lov 187 lbs, stable since lov   Discussed diet and wt/l      Reviewed labs   Ordered labs     She had R foot and calf pain lov    She notes outer R hip pain x 2-3 weeks that is exacerbated by laying down. She has not started any new exercise, and has taken ibuprofen. Possible trochanteric bursitis, rx'd steroid pack to decrease inflammation and ordered XR. Note that the steroid may increase blood sugars temporarily.    Reviewed duplex 10/29/24    No evidence of deep vein or superficial vein thrombosis in the right lower extremity. Vessels demonstrate normal compressibility, color filling, and phasic and spontaneous flow.     She previously saw Dr. Ernestina Torres (ortho) 10/19 for ankle pain may go back if her foot is not improving  Saw her again 5/2/23 for R foot pain  See above symptoms     No longer taking Lasix every day, infrequently PRN        Recall pt had myalgias on crestor not interested in starting statin  Previously I ordered Zetia 10 mg, however this gave her side effects so currently just working on diet for now not interested in medication     Continues on 2000U daily of vitamin D      Pt lives w/ her      ACP not on file. SDM is her , Homero Jackson.  Provided information in the past.          PREVENTIVE:

## 2025-06-05 ENCOUNTER — RESULTS FOLLOW-UP (OUTPATIENT)
Age: 75
End: 2025-06-05